# Patient Record
Sex: MALE | Race: WHITE | Employment: OTHER | URBAN - METROPOLITAN AREA
[De-identification: names, ages, dates, MRNs, and addresses within clinical notes are randomized per-mention and may not be internally consistent; named-entity substitution may affect disease eponyms.]

---

## 2018-01-17 ENCOUNTER — HOSPITAL ENCOUNTER (OUTPATIENT)
Dept: CT IMAGING | Age: 73
Discharge: HOME | DRG: 175 | End: 2018-01-17
Attending: INTERNAL MEDICINE
Payer: MEDICARE

## 2018-01-17 ENCOUNTER — HOSPITAL ENCOUNTER (OUTPATIENT)
Dept: VASCULAR SURGERY | Age: 73
Discharge: HOME | DRG: 175 | End: 2018-01-17
Attending: INTERNAL MEDICINE
Payer: MEDICARE

## 2018-01-17 ENCOUNTER — HOSPITAL ENCOUNTER (INPATIENT)
Age: 73
LOS: 2 days | Discharge: HOME | DRG: 175 | End: 2018-01-19
Attending: EMERGENCY MEDICINE | Admitting: INTERNAL MEDICINE
Payer: MEDICARE

## 2018-01-17 DIAGNOSIS — R60.0 LEG EDEMA, LEFT: ICD-10-CM

## 2018-01-17 DIAGNOSIS — I26.99 BILATERAL PULMONARY EMBOLISM (HCC): Primary | ICD-10-CM

## 2018-01-17 DIAGNOSIS — R06.02 SHORTNESS OF BREATH: ICD-10-CM

## 2018-01-17 DIAGNOSIS — I82.402 ACUTE DEEP VEIN THROMBOSIS (DVT) OF LEFT LOWER EXTREMITY, UNSPECIFIED VEIN (HCC): ICD-10-CM

## 2018-01-17 DIAGNOSIS — R06.00 DYSPNEA, UNSPECIFIED TYPE: ICD-10-CM

## 2018-01-17 PROBLEM — J18.9 PNEUMONIA: Status: ACTIVE | Noted: 2018-01-17

## 2018-01-17 PROBLEM — R77.8 TROPONIN I ABOVE REFERENCE RANGE: Status: ACTIVE | Noted: 2018-01-17

## 2018-01-17 LAB
ALBUMIN SERPL-MCNC: 3.5 G/DL (ref 3.4–5)
ALBUMIN/GLOB SERPL: 1 {RATIO} (ref 0.8–1.7)
ALP SERPL-CCNC: 70 U/L (ref 45–117)
ALT SERPL-CCNC: 32 U/L (ref 16–61)
ANION GAP SERPL CALC-SCNC: 9 MMOL/L (ref 3–18)
APTT PPP: 142.2 SEC (ref 23–36.4)
APTT PPP: 34.3 SEC (ref 23–36.4)
AST SERPL-CCNC: 39 U/L (ref 15–37)
BASOPHILS # BLD: 0 K/UL (ref 0–0.06)
BASOPHILS NFR BLD: 0 % (ref 0–2)
BILIRUB SERPL-MCNC: 0.9 MG/DL (ref 0.2–1)
BUN SERPL-MCNC: 19 MG/DL (ref 7–18)
BUN/CREAT SERPL: 13 (ref 12–20)
CALCIUM SERPL-MCNC: 9.5 MG/DL (ref 8.5–10.1)
CHLORIDE SERPL-SCNC: 99 MMOL/L (ref 100–108)
CK MB CFR SERPL CALC: 1.8 % (ref 0–4)
CK MB SERPL-MCNC: 3.9 NG/ML (ref 5–25)
CK SERPL-CCNC: 221 U/L (ref 39–308)
CO2 SERPL-SCNC: 30 MMOL/L (ref 21–32)
CREAT SERPL-MCNC: 1.52 MG/DL (ref 0.6–1.3)
CREAT UR-MCNC: 1.5 MG/DL (ref 0.6–1.3)
DIFFERENTIAL METHOD BLD: ABNORMAL
EOSINOPHIL # BLD: 0.3 K/UL (ref 0–0.4)
EOSINOPHIL NFR BLD: 3 % (ref 0–5)
ERYTHROCYTE [DISTWIDTH] IN BLOOD BY AUTOMATED COUNT: 13.2 % (ref 11.6–14.5)
GLOBULIN SER CALC-MCNC: 3.4 G/DL (ref 2–4)
GLUCOSE SERPL-MCNC: 95 MG/DL (ref 74–99)
HCT VFR BLD AUTO: 44.9 % (ref 36–48)
HGB BLD-MCNC: 14.3 G/DL (ref 13–16)
INR PPP: 1 (ref 0.8–1.2)
LYMPHOCYTES # BLD: 2.5 K/UL (ref 0.9–3.6)
LYMPHOCYTES NFR BLD: 24 % (ref 21–52)
MCH RBC QN AUTO: 31.2 PG (ref 24–34)
MCHC RBC AUTO-ENTMCNC: 31.8 G/DL (ref 31–37)
MCV RBC AUTO: 98 FL (ref 74–97)
MONOCYTES # BLD: 1.2 K/UL (ref 0.05–1.2)
MONOCYTES NFR BLD: 12 % (ref 3–10)
NEUTS SEG # BLD: 6.2 K/UL (ref 1.8–8)
NEUTS SEG NFR BLD: 61 % (ref 40–73)
PLATELET # BLD AUTO: 225 K/UL (ref 135–420)
PMV BLD AUTO: 9.5 FL (ref 9.2–11.8)
POTASSIUM SERPL-SCNC: 4.3 MMOL/L (ref 3.5–5.5)
PROT SERPL-MCNC: 6.9 G/DL (ref 6.4–8.2)
PROTHROMBIN TIME: 12.6 SEC (ref 11.5–15.2)
RBC # BLD AUTO: 4.58 M/UL (ref 4.7–5.5)
SODIUM SERPL-SCNC: 138 MMOL/L (ref 136–145)
TROPONIN I SERPL-MCNC: 0.08 NG/ML (ref 0–0.06)
WBC # BLD AUTO: 10.2 K/UL (ref 4.6–13.2)

## 2018-01-17 PROCEDURE — 36415 COLL VENOUS BLD VENIPUNCTURE: CPT | Performed by: EMERGENCY MEDICINE

## 2018-01-17 PROCEDURE — 93971 EXTREMITY STUDY: CPT

## 2018-01-17 PROCEDURE — 65270000029 HC RM PRIVATE

## 2018-01-17 PROCEDURE — 80053 COMPREHEN METABOLIC PANEL: CPT | Performed by: EMERGENCY MEDICINE

## 2018-01-17 PROCEDURE — 85025 COMPLETE CBC W/AUTO DIFF WBC: CPT | Performed by: EMERGENCY MEDICINE

## 2018-01-17 PROCEDURE — 82565 ASSAY OF CREATININE: CPT

## 2018-01-17 PROCEDURE — 85730 THROMBOPLASTIN TIME PARTIAL: CPT | Performed by: EMERGENCY MEDICINE

## 2018-01-17 PROCEDURE — 99284 EMERGENCY DEPT VISIT MOD MDM: CPT

## 2018-01-17 PROCEDURE — 71260 CT THORAX DX C+: CPT

## 2018-01-17 PROCEDURE — 74011636320 HC RX REV CODE- 636/320: Performed by: INTERNAL MEDICINE

## 2018-01-17 PROCEDURE — 84484 ASSAY OF TROPONIN QUANT: CPT | Performed by: EMERGENCY MEDICINE

## 2018-01-17 PROCEDURE — 93005 ELECTROCARDIOGRAM TRACING: CPT

## 2018-01-17 PROCEDURE — 85610 PROTHROMBIN TIME: CPT | Performed by: EMERGENCY MEDICINE

## 2018-01-17 PROCEDURE — 96365 THER/PROPH/DIAG IV INF INIT: CPT

## 2018-01-17 PROCEDURE — 74011250636 HC RX REV CODE- 250/636: Performed by: EMERGENCY MEDICINE

## 2018-01-17 PROCEDURE — 94762 N-INVAS EAR/PLS OXIMTRY CONT: CPT

## 2018-01-17 RX ORDER — SODIUM CHLORIDE 0.9 % (FLUSH) 0.9 %
5-10 SYRINGE (ML) INJECTION EVERY 8 HOURS
Status: DISCONTINUED | OUTPATIENT
Start: 2018-01-17 | End: 2018-01-19 | Stop reason: HOSPADM

## 2018-01-17 RX ORDER — ONDANSETRON 2 MG/ML
4 INJECTION INTRAMUSCULAR; INTRAVENOUS
Status: DISCONTINUED | OUTPATIENT
Start: 2018-01-17 | End: 2018-01-19 | Stop reason: HOSPADM

## 2018-01-17 RX ORDER — SODIUM CHLORIDE 0.9 % (FLUSH) 0.9 %
5-10 SYRINGE (ML) INJECTION AS NEEDED
Status: DISCONTINUED | OUTPATIENT
Start: 2018-01-17 | End: 2018-01-19 | Stop reason: HOSPADM

## 2018-01-17 RX ORDER — ALBUTEROL SULFATE 0.83 MG/ML
2.5 SOLUTION RESPIRATORY (INHALATION)
Status: DISCONTINUED | OUTPATIENT
Start: 2018-01-18 | End: 2018-01-19 | Stop reason: HOSPADM

## 2018-01-17 RX ORDER — HEPARIN SODIUM 10000 [USP'U]/100ML
18-36 INJECTION, SOLUTION INTRAVENOUS
Status: DISCONTINUED | OUTPATIENT
Start: 2018-01-17 | End: 2018-01-19

## 2018-01-17 RX ORDER — LEVOFLOXACIN 5 MG/ML
750 INJECTION, SOLUTION INTRAVENOUS EVERY 24 HOURS
Status: DISCONTINUED | OUTPATIENT
Start: 2018-01-17 | End: 2018-01-19 | Stop reason: CLARIF

## 2018-01-17 RX ORDER — LEVOTHYROXINE SODIUM 75 UG/1
88 TABLET ORAL
COMMUNITY

## 2018-01-17 RX ORDER — HEPARIN SODIUM 1000 [USP'U]/ML
80 INJECTION, SOLUTION INTRAVENOUS; SUBCUTANEOUS ONCE
Status: COMPLETED | OUTPATIENT
Start: 2018-01-17 | End: 2018-01-17

## 2018-01-17 RX ORDER — ACETAMINOPHEN 325 MG/1
650 TABLET ORAL
Status: DISCONTINUED | OUTPATIENT
Start: 2018-01-17 | End: 2018-01-19 | Stop reason: HOSPADM

## 2018-01-17 RX ADMIN — HEPARIN SODIUM AND DEXTROSE 18 UNITS/KG/HR: 10000; 5 INJECTION INTRAVENOUS at 15:16

## 2018-01-17 RX ADMIN — IOPAMIDOL 80 ML: 612 INJECTION, SOLUTION INTRAVENOUS at 15:00

## 2018-01-17 RX ADMIN — HEPARIN SODIUM 6820 UNITS: 1000 INJECTION, SOLUTION INTRAVENOUS; SUBCUTANEOUS at 15:15

## 2018-01-17 NOTE — ED PROVIDER NOTES
EMERGENCY DEPARTMENT HISTORY AND PHYSICAL EXAM    2:59 PM      Date: 1/17/2018  Patient Name: Blade Baez    History of Presenting Illness     Chief Complaint   Patient presents with    Shortness of Breath         History Provided By: Patient    Chief Complaint: SOB  Duration:  1/11/2018  Timing:  Progressive  Location: Generalized  Quality: N/A  Severity: 0 out of 10  Modifying Factors: SOB worse with ambulation. Associated Symptoms: LLE swelling    Additional History (Context): Blade Baez is a 81 y.o. male with PMHx of HTN who presents to the ED with c/o acute SOB worsening since Thursday 1/11. Patient states he works for [employer] in [place] and was travelling for work via plane and noticed LLE swelling 3 days ago. Reports SOB worse with ambulation. Also notes occasional cough, unchanged. Denies chest pain, fever, nausea, vomiting or BLE pain. Denies hx of blood in stool. Admits he was at his PCP's office this morning where his O2 sats were found to be between 89-94 % on RA. Reports PCP scheduled him for outpatient duplex and CT chest at [facility], of which radiologist called me with result of moderate burden bilateral PE's. Radiology sent pt to ED here at [facility]. No other symptoms or concerns were expressed. PCP: Alycia Chery MD    Past History     Past Medical History:  Past Medical History:   Diagnosis Date    Calculus of kidney     Calculus of ureter     History of vasectomy     Hydrocele, unspecified     Enlarged left hydrocele    Hypertension     Hypothyroidism     Multiple lipomas     right arm and forearm       Past Surgical History:  Past Surgical History:   Procedure Laterality Date    HX HERNIA REPAIR  1952    HX OTHER SURGICAL  2/11/11    Left hydrocelectomy    HX OTHER SURGICAL Right 10/7/13    Excision of lipomas arm and forearm    REMOVAL OF KIDNEY STONE  3/24/11    Ureteroscopic stone extraction with stent placement.        Family History:  History reviewed. No pertinent family history. Social History:  Social History   Substance Use Topics    Smoking status: Former Smoker    Smokeless tobacco: None    Alcohol use No       Allergies:  No Known Allergies      Review of Systems     Review of Systems   Constitutional: Negative for fever. HENT: Negative for sore throat. Eyes: Negative for redness and visual disturbance. Respiratory: Positive for shortness of breath. Negative for wheezing. Cardiovascular: Positive for leg swelling. Negative for chest pain. Gastrointestinal: Negative for abdominal pain, blood in stool, nausea and vomiting. Endocrine: Negative for polyuria. Genitourinary: Negative for dysuria. Musculoskeletal: Negative for arthralgias and neck stiffness. Skin: Negative for rash. Neurological: Negative for headaches. All other systems reviewed and are negative. Physical Exam     Visit Vitals    /88    Pulse 92    Temp 98.5 °F (36.9 °C)    Resp 20    Ht 5' 10.5\" (1.791 m)    Wt 85.3 kg (188 lb)    SpO2 97%    BMI 26.59 kg/m2     Physical Exam   Constitutional: He is oriented to person, place, and time. He appears well-developed and well-nourished. No distress. HENT:   Head: Normocephalic and atraumatic. Mouth/Throat: Oropharynx is clear and moist.   Eyes: Conjunctivae are normal. Pupils are equal, round, and reactive to light. No scleral icterus. Neck: Normal range of motion. Neck supple. Cardiovascular: Normal rate and intact distal pulses. Capillary refill < 3 seconds   Pulmonary/Chest: Effort normal and breath sounds normal. No respiratory distress. He has no wheezes. O2 99% RA   Abdominal: Soft. Bowel sounds are normal. He exhibits no distension. There is no tenderness. Musculoskeletal: Normal range of motion. He exhibits edema. Pitting edema LLE  No calf tenderness   Lymphadenopathy:     He has no cervical adenopathy.    Neurological: He is alert and oriented to person, place, and time. No cranial nerve deficit. Skin: Skin is warm and dry. He is not diaphoretic. There is erythema (L calf, mild). Nursing note and vitals reviewed. Diagnostic Study Results     Labs -  Recent Results (from the past 12 hour(s))   POC CREATININE    Collection Time: 01/17/18  2:06 PM   Result Value Ref Range    Creatinine, POC 1.5 (H) 0.6 - 1.3 MG/DL    GFRAA, POC 56 (L) >60 ml/min/1.73m2    GFRNA, POC 46 (L) >60 ml/min/1.73m2   CBC WITH AUTOMATED DIFF    Collection Time: 01/17/18  3:15 PM   Result Value Ref Range    WBC 10.2 4.6 - 13.2 K/uL    RBC 4.58 (L) 4.70 - 5.50 M/uL    HGB 14.3 13.0 - 16.0 g/dL    HCT 44.9 36.0 - 48.0 %    MCV 98.0 (H) 74.0 - 97.0 FL    MCH 31.2 24.0 - 34.0 PG    MCHC 31.8 31.0 - 37.0 g/dL    RDW 13.2 11.6 - 14.5 %    PLATELET 650 460 - 022 K/uL    MPV 9.5 9.2 - 11.8 FL    NEUTROPHILS 61 40 - 73 %    LYMPHOCYTES 24 21 - 52 %    MONOCYTES 12 (H) 3 - 10 %    EOSINOPHILS 3 0 - 5 %    BASOPHILS 0 0 - 2 %    ABS. NEUTROPHILS 6.2 1.8 - 8.0 K/UL    ABS. LYMPHOCYTES 2.5 0.9 - 3.6 K/UL    ABS. MONOCYTES 1.2 0.05 - 1.2 K/UL    ABS. EOSINOPHILS 0.3 0.0 - 0.4 K/UL    ABS. BASOPHILS 0.0 0.0 - 0.06 K/UL    DF AUTOMATED     METABOLIC PANEL, COMPREHENSIVE    Collection Time: 01/17/18  3:15 PM   Result Value Ref Range    Sodium 138 136 - 145 mmol/L    Potassium 4.3 3.5 - 5.5 mmol/L    Chloride 99 (L) 100 - 108 mmol/L    CO2 30 21 - 32 mmol/L    Anion gap 9 3.0 - 18 mmol/L    Glucose 95 74 - 99 mg/dL    BUN 19 (H) 7.0 - 18 MG/DL    Creatinine 1.52 (H) 0.6 - 1.3 MG/DL    BUN/Creatinine ratio 13 12 - 20      GFR est AA 55 (L) >60 ml/min/1.73m2    GFR est non-AA 46 (L) >60 ml/min/1.73m2    Calcium 9.5 8.5 - 10.1 MG/DL    Bilirubin, total 0.9 0.2 - 1.0 MG/DL    ALT (SGPT) 32 16 - 61 U/L    AST (SGOT) 39 (H) 15 - 37 U/L    Alk.  phosphatase 70 45 - 117 U/L    Protein, total 6.9 6.4 - 8.2 g/dL    Albumin 3.5 3.4 - 5.0 g/dL    Globulin 3.4 2.0 - 4.0 g/dL    A-G Ratio 1.0 0.8 - 1.7     PROTHROMBIN TIME + INR    Collection Time: 01/17/18  3:15 PM   Result Value Ref Range    Prothrombin time 12.6 11.5 - 15.2 sec    INR 1.0 0.8 - 1.2     PTT    Collection Time: 01/17/18  3:15 PM   Result Value Ref Range    aPTT 34.3 23.0 - 36.4 SEC   CARDIAC PANEL,(CK, CKMB & TROPONIN)    Collection Time: 01/17/18  3:15 PM   Result Value Ref Range     39 - 308 U/L    CK - MB 3.9 (H) <3.6 ng/ml    CK-MB Index 1.8 0.0 - 4.0 %    Troponin-I, Qt. 0.08 (H) 0.00 - 0.06 NG/ML   EKG, 12 LEAD, SUBSEQUENT    Collection Time: 01/17/18  3:29 PM   Result Value Ref Range    Ventricular Rate 85 BPM    Atrial Rate 85 BPM    P-R Interval 184 ms    QRS Duration 96 ms    Q-T Interval 374 ms    QTC Calculation (Bezet) 445 ms    Calculated P Axis 48 degrees    Calculated R Axis -15 degrees    Calculated T Axis 31 degrees    Diagnosis       Normal sinus rhythm  Septal infarct , age undetermined  Possible Lateral infarct , age undetermined  Abnormal ECG         Radiologic Studies -   No orders to display    Duplex LLE:  Left leg :  1. Acute occlusive deep venous thrombosis identified in the common  femoral, femoral and  deep femoral veins. 2. Acute non-occlusive  deep venous thrombosis identified in the  popliteal vein. 3. Deep veins visualized include the common femoral, femoral, deep  femoral, popliteal, posterior tibial and peroneal veins. 4. Acute non-occlusive superficial venous thrombosis identified in the   great saphenous vein at the sapheno-femoral junction. No evidence of deep vein thrombosis in the contralateral common  femoral vein. 5. Acute non-occlusive superficial venous thrombosis identified in the   great saphenous vein at the sapheno-femoral junction. 6. Superficial vein visualized include the great saphenous vein. CT Chest:  1.  Moderate to large burden of acute pulmonary emboli in bilateral lower lobes  with complete to near complete occlusion.  -Patient was informed and taken to ER registration by the technologist. Also  discussed with the ER physician Dr. Daxa Gann. 2. Lingular consolidative opacities with air bronchogram likely pulmonary  infarct versus infection. 3. Plaque-like pleural thickening or loculated fluid in the lateral right  midlung measuring 3.6 x 1.2 cm (Hounsfield unit 17), probably loculated fluid. 4. Trace left pleural effusion. Medical Decision Making   I am the first provider for this patient. I reviewed the vital signs, available nursing notes, past medical history, past surgical history, family history and social history. Vital Signs-Reviewed the patient's vital signs. Pulse Oximetry Analysis -  99% on room air, normal    Cardiac Monitor:  Rate: 85  Rhythm:  Normal Sinus Rhythm     EKG: Interpreted by the EP. Time Interpreted: 15:31   Rate: 85   Rhythm: Normal Sinus Rhythm    Interpretation: Normal QRS duration. No ST elevation. No T wave inversion. Records Reviewed: Nursing Notes, Old Medical Records and Previous Radiology Studies (Time of Review: 2:59 PM)    Provider Notes (Medical Decision Making):  MDM  Number of Diagnoses or Management Options  Acute deep vein thrombosis (DVT) of left lower extremity, unspecified vein ([address]):   Bilateral pulmonary embolism ([address]):   Dyspnea, unspecified type: new, needed workup  Diagnosis management comments: Radiologist called and informed patient with moderate burden pulmonary embolism B/L low lobes. Also patient with LLE DVT. Patient was on long trip to [facility] and was hiking. Reports SOB, will give IV Heparin bolus and gtt. Patient will require admission. Patient is talking, airway patent. I explained dx's and plan for admission to [facility], pt agrees.         Amount and/or Complexity of Data Reviewed  Clinical lab tests: ordered and reviewed  Tests in the radiology section of CPT®: ordered and reviewed  Tests in the medicine section of CPT®: ordered and reviewed  Discussion of test results with the performing providers: yes  Review and summarize past medical records: yes  Discuss the patient with other providers: yes  Independent visualization of images, tracings, or specimens: yes    Risk of Complications, Morbidity, and/or Mortality  Presenting problems: high  Diagnostic procedures: high  Management options: high    Critical Care  Total time providing critical care: 30-74 minutes    Critical Care:  3:21 PM  I have spent 32 minutes of critical care time involved in lab review, consultations with specialist, family decision-making, and documentation. During this entire length of time I was immediately available to the patient. Critical Care: The reason for providing this level of medical care for this critically ill patient was due a critical illness that impaired one or more vital organ systems such that there was a high probability of imminent or life threatening deterioration in the patients condition. This care involved high complexity decision making to assess, manipulate, and support vital system functions, to treat this degreee vital organ system failure and to prevent further life threatening deterioration of the patients condition. Medications   heparin 25,000 units in D5W 250 ml infusion (18 Units/kg/hr × 85.3 kg IntraVENous New Bag 1/17/18 1516)   heparin (porcine) 1,000 unit/mL injection 6,820 Units (6,820 Units IntraVENous Given 1/17/18 1515)     ED Course: Progress Notes, Reevaluation, and Consults:  2:50: Radiologist called stating patient had an outpatient CTA chest which demonstrated large PE. Reports patient called in to be evaluated in ED.    2:59: Spoke to vascular tech, states patient positive for DVT in LLE in outpatient vascular study. Consult:  Discussed care with Dr. Mila Manning, hospitalist. Standard discussion; including history of patients chief complaint, available diagnostic results, and treatment course. Accepts admit. 3:59 PM, 1/17/2018     For Hospitalized Patients:    1.  Hospitalization Decision Time:  The decision to hospitalize the patient was made by Dr. Mackenzie Agosto at 3:32 PM on 1/17/2018    2. Aspirin: Aspirin was not given because the patient did not present with a stroke at the time of their Emergency Department evaluation    Diagnosis     Clinical Impression:   1. Bilateral pulmonary embolism ([address]    2. Acute deep vein thrombosis (DVT) of left lower extremity, unspecified vein (HCC)    3. Dyspnea, unspecified type        Disposition: Admit. Follow-up Information     None           _______________________________    Attestations:  Scribe Attestation     Kacey acting as a scribe for and in the presence of Ludwig Barrera DO      January 17, 2018 at 2:59 PM       Provider Attestation:      I personally performed the services described in the documentation, reviewed the documentation, as recorded by the scribe in my presence, and it accurately and completely records my words and actions.  January 17, 2018 at 2:59 PM - Ludwig Barrera DO    _______________________________

## 2018-01-17 NOTE — ED NOTES
TRANSFER - OUT REPORT:    TelephoneVerbal report given to [facility]) on [facility]  being transferred to [place] [id number](unit) for routine progression of care       Report consisted of patients Situation, Background, Assessment and   Recommendations(SBAR). Information from the following report(s) SBAR, Kardex, ED Summary, Procedure Summary, Intake/Output, MAR, Accordion and Recent Results was reviewed with the receiving nurse. Opportunity for questions and clarification was provided.       Patient transported with:   Monitor  Tech

## 2018-01-17 NOTE — IP AVS SNAPSHOT
[address] Patient: Иван Hall 
MRN: [id number] :1945 About your hospitalization You were admitted on:  2018 You last received care in the:  [facility] [place] [id number] [facility] You were discharged on:  2018 Why you were hospitalized Your primary diagnosis was:  Not on File Your diagnoses also included:  Dyspnea, Bilateral Pulmonary Embolism (Hcc), Leg Dvt (Deep Venous Thromboembolism), Acute, Left (Hcc), Pneumonia, Pulmonary Embolism (Hcc), Troponin I Above Reference Range Follow-up Information Follow up With Details Comments Contact Info Drew Rosa MD On 2018 @8:30AM 3125 [facility] [address] [address] 
[phone] Beronica Clements MD   [address] [place] [address] [address] [address] 
[phone] Discharge Orders Procedure Order Date Status Priority Quantity Spec Type Associated Dx CT CHEST W CONT 18 1237 Future Routine 1 Questions: Is Patient Allergic to Contrast Dye?:  No  
  STAT Creatinine as indicated: Yes  
        
 2D ECHO LIMTED ADULT W OR WO CONTR 18 1237 Future Routine 1 Questions: Reason for Exam:  bilateral pulmonary emboli Contrast Enhancement (Bubble Study, Definity, Optison) may be used if criteria listed in established evidence-based protocol has been identified.:  Yes A check prema indicates which time of day the medication should be taken. My Medications START taking these medications Instructions Each Dose to Equal  
 Morning Noon Evening Bedtime  
 famotidine 20 mg tablet Commonly known as:  PEPCID Your last dose was: Your next dose is: Take 1 Tab by mouth two (2) times a day. 20 mg  
    
   
   
   
  
 rivaroxaban 15 mg (42)- 20 mg (9) Dspk Commonly known as:  Mc SHARMA Your last dose was: Your next dose is: Take one 15 mg tablet twice a day with food for the first 21 days. Then, take one 20 mg tablet once a day with food for 9 days. CONTINUE taking these medications Instructions Each Dose to Equal  
 Morning Noon Evening Bedtime BENICAR PO Your last dose was: Your next dose is: Take 20 mg by mouth daily. 1/2 tab daily 20 mg  
    
   
   
   
  
 LIPITOR PO Your last dose was: Your next dose is: Take 10 mg by mouth. 10 mg  
    
   
   
   
  
 MULTIVITAMIN PO Your last dose was: Your next dose is: Take  by mouth. SYNTHROID 75 mcg tablet Generic drug:  levothyroxine Your last dose was: Your next dose is: Take  by mouth Daily (before breakfast). VALIUM PO Your last dose was: Your next dose is: Take  by mouth. STOP taking these medications ASPIRIN PO  
   
  
  
ASK your doctor about these medications Instructions Each Dose to Equal  
 Morning Noon Evening Bedtime VALTREX 500 mg tablet Generic drug:  valACYclovir Your last dose was: Your next dose is: Take  by mouth two (2) times a day. Where to Get Your Medications These medications were sent to [address] [place] [address], [address] [place] [address] [address]. [address] [place] [address] Phone:  [phone]  
  rivaroxaban 15 mg (42)- 20 mg (9) Dspk Information on where to get these meds will be given to you by the nurse or doctor. ! Ask your nurse or doctor about these medications  
  famotidine 20 mg tablet Discharge Instructions Discharge Instructions Patient: Daisy Lares MRN: [id number]  [facility]: [id number] YOB: 1945  Age: 81 y.o. Sex: male DOA: 1/17/2018 LOS:  LOS: 2 days   Discharge Date: DIET:  Cardiac Diet ACTIVITY: Activity as tolerated ADDITIONAL INFORMATION: If you experience any of the following symptoms but not limited to Fever, chills, nausea, vomiting, diarrhea, change in mentation, falling, bleeding, shortness of breath, chest pain, please call your primary care physician or return to the emergency room if you cannot get hold of your doctor:  
 
FOLLOW UP CARE: 
PCP in 1 week Pulmonary in 10-14 days Phillip Guevara NP 
1/19/2018 12:20 PM 
 
 
 
 
 
  
Deep Vein Thrombosis: Care Instructions Your Care Instructions A deep vein thrombosis (DVT) is a blood clot in certain veins of the legs, pelvis, or arms. Blood clots in these veins need to be treated because they can get bigger, break loose, and travel through the bloodstream to the lungs. A blood clot in a lung can be life-threatening. The doctor may have given you a blood thinner (anticoagulant). A blood thinner can stop the blood clot from growing larger and prevent new clots from forming. You will need to take a blood thinner for 3 to 6 months or longer. The doctor has checked you carefully, but problems can develop later. If you notice any problems or new symptoms, get medical treatment right away. Follow-up care is a key part of your treatment and safety. Be sure to make and go to all appointments, and call your doctor if you are having problems. It's also a good idea to know your test results and keep a list of the medicines you take. How can you care for yourself at home? · Take your medicines exactly as prescribed. Call your doctor if you think you are having a problem with your medicine. · If you are taking a blood thinner, be sure you get instructions about how to take your medicine safely. Blood thinners can cause serious bleeding problems. · Wear compression stockings if your doctor recommends them.  These stockings are tighter at the feet than on the legs. They may reduce pain and swelling in your legs. But there are different types of stockings, and they need to fit right. So your doctor will recommend what you need. · When you sit, use a pillow to raise the arm or leg that has the blood clot. Try to keep it above the level of your heart. When should you call for help? Call 911 anytime you think you may need emergency care. For example, call if: 
? · You passed out (lost consciousness). ? · You have symptoms of a blood clot in your lung (called a pulmonary embolism). These include: 
¨ Sudden chest pain. ¨ Trouble breathing. ¨ Coughing up blood. ?Call your doctor now or seek immediate medical care if: 
? · You have new or worse trouble breathing. ? · You are dizzy or lightheaded, or you feel like you may faint. ? · You have symptoms of a blood clot in your arm or leg. These may include: 
¨ Pain in the arm, calf, back of the knee, thigh, or groin. ¨ Redness and swelling in the arm, leg, or groin. ? Watch closely for changes in your health, and be sure to contact your doctor if: 
? · You do not get better as expected. Where can you learn more? Go to http://wyatt-clara.info/. Enter L758 in the search box to learn more about \"Deep Vein Thrombosis: Care Instructions. \" Current as of: March 20, 2017 Content Version: 11.4 © 7974-2289 SKINNYprice. Care instructions adapted under license by [employer] (which disclaims liability or warranty for this information). If you have questions about a medical condition or this instruction, always ask your healthcare professional. [place] [address] any warranty or liability for your use of this information. Pulmonary Embolism: Care Instructions Your Care Instructions Pulmonary embolism is the sudden blockage of an artery in the lung.  Blood clots in the deep veins of the leg or pelvis (deep vein thrombosis, or DVT) are the most common cause. These blood clots can travel to the lungs. Pulmonary embolism can be very serious. Because you have had one pulmonary embolism, you are at greater risk for having another one. But you can take steps to prevent another pulmonary embolism by following your doctor's instructions. You will probably take a prescription blood-thinning medicine to prevent blood clots. A blood thinner can stop a blood clot from growing larger and prevent new clots from forming. Follow-up care is a key part of your treatment and safety. Be sure to make and go to all appointments, and call your doctor if you are having problems. It's also a good idea to know your test results and keep a list of the medicines you take. How can you care for yourself at home? · Take your medicines exactly as prescribed. Call your doctor if you think you are having a problem with your medicine. You will get more details on the specific medicines your doctor prescribes. · If you are taking a blood thinner, be sure you get instructions about how to take your medicine safely. Blood thinners can cause serious bleeding problems. Preventing future pulmonary embolisms · Exercise. Keep blood moving in your legs to keep clots from forming. If you are traveling by car, stop every hour or so. Get out and walk around for a few minutes. If you are traveling by bus, train, or plane, get out of your seat and walk up and down the aisles every hour or so. You also can do leg exercises while you are seated. Pump your feet up and down by pulling your toes up toward your knees then pointing them down. · Get up out of bed as soon as possible after an illness or surgery. · Do not smoke. If you need help quitting, talk to your doctor about stop-smoking programs and medicines. These can increase your chances of quitting for good. · Check with your doctor before taking hormone or birth control pills. These may increase your risk of blot clots. · Ask your doctor about wearing compression stockings to help prevent blood clots in your legs. There are different types of stockings, and they need to fit right. So your doctor will recommend what you need. When should you call for help? Call 911 anytime you think you may need emergency care. For example, call if: 
? · You have shortness of breath. ? · You have chest pain. ? · You passed out (lost consciousness). ? · You cough up blood. ?Call your doctor now or seek immediate medical care if: 
? · You have new or worsening pain or swelling in your leg. ? Watch closely for changes in your health, and be sure to contact your doctor if: 
? · You do not get better as expected. Where can you learn more? Go to http://wyatt-clara.info/. Enter E411 in the search box to learn more about \"Pulmonary Embolism: Care Instructions. \" Current as of: March 20, 2017 Content Version: 11.4 © 9446-9279 Packetmotion. Care instructions adapted under license by [employer] (which disclaims liability or warranty for this information). If you have questions about a medical condition or this instruction, always ask your healthcare professional. [address] any warranty or liability for your use of this information. Pneumonia: Care Instructions Your Care Instructions Pneumonia is an infection of the lungs. Most cases are caused by infections from bacteria or viruses. Pneumonia may be mild or very severe. If it is caused by bacteria, you will be treated with antibiotics. It may take a few weeks to a few months to recover fully from pneumonia, depending on how sick you were and whether your overall health is good. Follow-up care is a key part of your treatment and safety.  Be sure to make and go to all appointments, and call your doctor if you are having problems. It's also a good idea to know your test results and keep a list of the medicines you take. How can you care for yourself at home? · Take your antibiotics exactly as directed. Do not stop taking the medicine just because you are feeling better. You need to take the full course of antibiotics. · Take your medicines exactly as prescribed. Call your doctor if you think you are having a problem with your medicine. · Get plenty of rest and sleep. You may feel weak and tired for a while, but your energy level will improve with time. · To prevent dehydration, drink plenty of fluids, enough so that your urine is light yellow or clear like water. Choose water and other caffeine-free clear liquids until you feel better. If you have kidney, heart, or liver disease and have to limit fluids, talk with your doctor before you increase the amount of fluids you drink. · Take care of your cough so you can rest. A cough that brings up mucus from your lungs is common with pneumonia. It is one way your body gets rid of the infection. But if coughing keeps you from resting or causes severe fatigue and chest-wall pain, talk to your doctor. He or she may suggest that you take a medicine to reduce the cough. · Use a vaporizer or humidifier to add moisture to your bedroom. Follow the directions for cleaning the machine. · Do not smoke or allow others to smoke around you. Smoke will make your cough last longer. If you need help quitting, talk to your doctor about stop-smoking programs and medicines. These can increase your chances of quitting for good. · Take an over-the-counter pain medicine, such as acetaminophen (Tylenol), ibuprofen (Advil, Motrin), or naproxen (Aleve). Read and follow all instructions on the label. · Do not take two or more pain medicines at the same time unless the doctor told you to.  Many pain medicines have acetaminophen, which is Tylenol. Too much acetaminophen (Tylenol) can be harmful. · If you were given a spirometer to measure how well your lungs are working, use it as instructed. This can help your doctor tell how your recovery is going. · To prevent pneumonia in the future, talk to your doctor about getting a flu vaccine (once a year) and a pneumococcal vaccine (one time only for most people). When should you call for help? Call 911 anytime you think you may need emergency care. For example, call if: 
? · You have severe trouble breathing. ?Call your doctor now or seek immediate medical care if: 
? · You cough up dark brown or bloody mucus (sputum). ? · You have new or worse trouble breathing. ? · You are dizzy or lightheaded, or you feel like you may faint. ? Watch closely for changes in your health, and be sure to contact your doctor if: 
? · You have a new or higher fever. ? · You are coughing more deeply or more often. ? · You are not getting better after 2 days (48 hours). ? · You do not get better as expected. Where can you learn more? Go to http://wyatt-clara.info/. Enter [id number] in the search box to learn more about \"Pneumonia: Care Instructions. \" Current as of: May 12, 2017 Content Version: 11.4 © 2782-3300 Nginx. Care instructions adapted under license by [employer] (which disclaims liability or warranty for this information). If you have questions about a medical condition or this instruction, always ask your healthcare professional. [place] [address] any warranty or liability for your use of this information. Patient armband removed and shredded DISCHARGE SUMMARY from Nurse PATIENT INSTRUCTIONS: 
 
 
F-face looks uneven A-arms unable to move or move unevenly S-speech slurred or non-existent T-time-call 911 as soon as signs and symptoms begin-DO NOT go Back to bed or wait to see if you get better-TIME IS BRAIN. Warning Signs of HEART ATTACK Call 911 if you have these symptoms: 
? Chest discomfort. Most heart attacks involve discomfort in the center of the chest that lasts more than a few minutes, or that goes away and comes back. It can feel like uncomfortable pressure, squeezing, fullness, or pain. ? Discomfort in other areas of the upper body. Symptoms can include pain or discomfort in one or both arms, the back, neck, jaw, or stomach. ? Shortness of breath with or without chest discomfort. ? Other signs may include breaking out in a cold sweat, nausea, or lightheadedness. Don't wait more than five minutes to call [address]! Fast action can save your life. Calling 911 is almost always the fastest way to get lifesaving treatment. Emergency Medical Services staff can begin treatment when they arrive  up to an hour sooner than if someone gets to the hospital by car. The discharge information has been reviewed with the patient. The patient verbalized understanding. Discharge medications reviewed with the patient and appropriate educational materials and side effects teaching were provided. ___________________________________________________________________________________________________________________________________ Introducing [facility] SERVICES! Elsa Pabon introduces Solio patient portal. Now you can access parts of your medical record, email your doctor's office, and request medication refills online. 1. In your internet browser, go to [email or web address] 2. Click on the First Time User? Click Here link in the Sign In box. You will see the New Member Sign Up page. 3. Enter your Nevigo Access Code exactly as it appears below. You will not need to use this code after youve completed the sign-up process. If you do not sign up before the expiration date, you must request a new code. · Nevigo Access Code: [email or web address] Expires: 4/17/2018  9:42 AM 
 
4. Enter the last four digits of your Social Security Number (xxxx) and Date of Birth (mm/dd/yyyy) as indicated and click Submit. You will be taken to the next sign-up page. 5. Create a Nevigo ID. This will be your Nevigo login ID and cannot be changed, so think of one that is secure and easy to remember. 6. Create a Nevigo password. You can change your password at any time. 7. Enter your Password Reset Question and Answer. This can be used at a later time if you forget your password. 8. Enter your e-mail address. You will receive e-mail notification when new information is available in [address] 9. Click Sign Up. You can now view and download portions of your medical record. 10. Click the Download Summary menu link to download a portable copy of your medical information. If you have questions, please visit the Frequently Asked Questions section of the Nevigo website. Remember, Nevigo is NOT to be used for urgent needs. For medical emergencies, dial 911. Now available from your iPhone and Android! Unresulted Labs-Please follow up with your PCP about these lab tests Order Current Status CARDIOLIPIN AB PANEL In process FACTOR V LEIDEN In process LUPUS ANTICOAGULANT PANEL W/ REFLEX In process PROTEIN C ACTIVITY In process PROTEIN S ANTIGEN In process Providers Seen During Your Hospitalization Provider Specialty Primary office phone Lara Liu DO Emergency Medicine [phone] Lorenzo Trinidad MD Internal Medicine [phone] Yanet Ojeda MD [facility] [phone] Bobbi Richardson MD Internal Medicine [phone] Jane Osborn MD Family Practice [phone] Your Primary Care Physician (PCP) Primary Care Physician Office Phone Office Fax Rudolph Fuchs [phone] You are allergic to the following No active allergies Recent Documentation Height Weight BMI Smoking Status 1.791 m 85.6 kg 26.69 kg/m2 Former Smoker Emergency Contacts Name Discharge Info Relation Home Work Mobile Day,[place] DISCHARGE CAREGIVER [3] Spouse [3] [phone] Patient Belongings The following personal items are in your possession at time of discharge: 
  Dental Appliances: None  Visual Aid: Glasses, With patient      Home Medications: None   Jewelry: Watch, Ring  Clothing: Jacket/Coat, Pants, Shirt, Socks, Footwear    Other Valuables: Cell Phone Discharge Instructions Attachments/References RIVAROXABAN (BY MOUTH) (ENGLISH) FAMOTIDINE (BY MOUTH) (ENGLISH) Patient Handouts Rivaroxaban (By mouth) Rivaroxaban ([id number]-l-GMM-a-ban) Treats and prevents blood clots, which lowers the risk of stroke, deep vein thrombosis (DVT), pulmonary embolism (PE), and similar conditions. This medicine is a blood thinner. Brand Name(s): Xarelto, Xarelto Starter Pack There may be other brand names for this medicine. When This Medicine Should Not Be Used: This medicine is not right for everyone. Do not use it if you had an allergic reaction to rivaroxaban, or you have severe bleeding. How to Use This Medicine:  
Tablet · Take this medicine as directed, and take it at the same time each day. · 10-milligram (mg) tablet: Take with or without food. · 15-mg or 20-mg tablet: Take with food. · If you cannot swallow the tablets, you may crush the tablet and mix it with applesauce. Eat some food after you swallow the mixture. · Tube feeding: You may crush the tablet and mix the medicine in 50 milliliters (mL) of water before giving it via the tube. This must be followed by a feeding. · This medicine should come with a Medication Guide. Ask your pharmacist for a copy if you do not have one. · Missed dose: ¨ Ask your doctor or pharmacist if you are not sure what to do if you miss a dose. ¨ Once-daily dose: If you miss a dose or forget to use your medicine, use it as soon as you can on the same day. Do not use extra medicine to make up for a missed dose. ¨ Twice-daily dose to treat a blood clot (15-mg tablet): If you miss a dose or forget to use your medicine, use it as soon as you can on the same day. You may take 2 doses at the same time to make up for the missed dose. This is only for people who take a total of 30 mg per day. · Store the medicine in a closed container at room temperature, away from heat, moisture, and direct light. Drugs and Foods to Avoid: Ask your doctor or pharmacist before using any other medicine, including over-the-counter medicines, vitamins, and herbal products. · Some foods and medicines can affect how rivaroxaban works. Tell your doctor if you are using any of the following: ¨ NSAID medicine (including aspirin, celecoxib, diclofenac, ibuprofen, naproxen) ¨ Ketoconazole, itraconazole, lopinavir, ritonavir, indinavir, conivaptan, carbamazepine, phenytoin, rifampin, Bryan's wort ¨ Another blood thinner (including clopidogrel, enoxaparin, heparin, warfarin) Warnings While Using This Medicine: · Tell your doctor if you are pregnant or breastfeeding, or if you have kidney disease, liver disease, bleeding problems, or an artificial heart valve. · This medicine may increase your risk of bleeding. Be careful to avoid injuries that could cause bleeding. Stay away from rough sports or other situations where you could be bruised, cut, or hurt.  Brush and floss your teeth gently. Be careful when using sharp objects, including razors and fingernail clippers. Avoid picking your nose. If you need to blow your nose, blow it gently. · This medicine may cause nerve damage if you have a medical procedure done to your back, including anesthesia or a spinal puncture. This is more likely to happen if you have a history of back injury, back surgery, problems with your spine, or procedures or punctures to your back. Tell your doctor if you are also taking another blood thinner, because this also increases the risk. · Do not stop using this medicine suddenly without asking your doctor. You might have a higher risk of stroke for a short time after you stop using this medicine. · Tell any doctor or dentist who treats you that you are using this medicine. · Your doctor will do lab tests at regular visits to check on the effects of this medicine. Keep all appointments. · Keep all medicine out of the reach of children. Never share your medicine with anyone. Possible Side Effects While Using This Medicine:  
Call your doctor right away if you notice any of these side effects: · Allergic reaction: Itching or hives, swelling in your face or hands, swelling or tingling in your mouth or throat, chest tightness, trouble breathing · Blistering, peeling, or red skin rash · Decrease in how much or how often you urinate · Heavy menstrual bleeding, or vaginal bleeding · Red or brown urine, bloody or black, tarry stools · Unusual bleeding or bruising, including frequent nosebleeds · Vomiting blood or material that looks like coffee grounds If you notice other side effects that you think are caused by this medicine, tell your doctor. Call your doctor for medical advice about side effects. You may report side effects to FDA at 0-832-FVF-4887 © 2017 [facility] Information is for End User's use only and may not be sold, redistributed or otherwise used for commercial purposes. The above information is an  only. It is not intended as medical advice for individual conditions or treatments. Talk to your doctor, nurse or pharmacist before following any medical regimen to see if it is safe and effective for you. Famotidine (By mouth) Famotidine (ivf-CF-xa-analilia) Treats ulcers, gastroesophageal reflux disease (GERD), and conditions that cause the stomach to produce too much stomach acid. Also treats heartburn caused by acid indigestion. Brand Name(s): Acid Controller, Acid Reducer, [facility] Acid Reducer, Good Sense Acid Reducer, Heartburn Relief, Leader Acid Reducer, Pepcid, Pepcid AC, Quality Choice Acid Controller, [facility] Acid Reducer, [facility] Famotidine Acid Reducer, TopCare Acid Reducer There may be other brand names for this medicine. When This Medicine Should Not Be Used: You should not use this medicine if you have had an allergic reaction to famotidine or to similar medicines such as ranitidine (Zantac®), cimetidine (Tagamet®), or nizatidine (Axid®). How to Use This Medicine:  
Tablet, Chewable Tablet, Dissolving Tablet, Liquid · Your doctor will tell you how much medicine to use. Do not use more than directed. · Follow the instructions on the medicine label if you are using this medicine without a prescription. · The chewable tablet must be chewed completely before you swallow it. · If you are using the disintegrating tablet, make sure your hands are dry before you handle the tablet. Do not open the blister pack that contains the tablet until you are ready to take it. Remove the tablet from the blister pack by peeling back the foil, then taking the tablet out. Do not push the tablet through the foil. Place the tablet in your mouth. It should melt within 2 minutes. Swallow after the tablet has melted. · Shake the oral liquid medicine for 5 to 10 seconds before each use. Measure the medicine with a marked measuring spoon or medicine cup. If a dose is missed: · Take a dose as soon as you remember. If it is almost time for your next dose, wait until then and take a regular dose. Do not take extra medicine to make up for a missed dose. How to Store and Dispose of This Medicine: · Store the medicine in a closed container at room temperature, away from heat, moisture, and direct light. Do not freeze the oral liquid. · Ask your pharmacist, doctor, or health caregiver about the best way to dispose of any outdated medicine or medicine no longer needed. Throw away any unused oral liquid that is more than 3month old. · Keep all medicine out of the reach of children. Never share your medicine with anyone. Drugs and Foods to Avoid: Ask your doctor or pharmacist before using any other medicine, including over-the-counter medicines, vitamins, and herbal products. Warnings While Using This Medicine: · Make sure your doctor knows if you are pregnant or breastfeeding, or if you have kidney disease or liver disease. · This medicine might contain phenylalanine (aspartame). This is only a concern if you have a disorder called phenylketonuria (a problem with amino acids). If you have this condition, talk to your doctor before using this medicine. · Call your doctor if your symptoms do not improve or if they get worse. Possible Side Effects While Using This Medicine:  
Call your doctor right away if you notice any of these side effects: · Allergic reaction: Itching or hives, swelling in your face or hands, swelling or tingling in your mouth or throat, chest tightness, trouble breathing · Blistering, peeling, or red skin rash. · Dark-colored urine or pale stools. · Fast, pounding, or uneven heartbeat. · Fever, chills, cough, sore throat, and body aches. · Seizures. · Unusual bleeding, bruising, or weakness. · Yellowing of your skin or the whites of your eyes. If you notice these less serious side effects, talk with your doctor: · Constipation, diarrhea, or upset stomach. · Headache or dizziness. · Nausea or vomiting. If you notice other side effects that you think are caused by this medicine, tell your doctor. Call your doctor for medical advice about side effects. You may report side effects to FDA at 9-146-FDA-1088 © 2017 [address] Information is for End User's use only and may not be sold, redistributed or otherwise used for commercial purposes. The above information is an  only. It is not intended as medical advice for individual conditions or treatments. Talk to your doctor, nurse or pharmacist before following any medical regimen to see if it is safe and effective for you. Please provide this summary of care documentation to your next provider. Signatures-by signing, you are acknowledging that this After Visit Summary has been reviewed with you and you have received a copy. Patient Signature:  ____________________________________________________________ Date:  ____________________________________________________________  
  
Anusha Mealing Provider Signature:  ____________________________________________________________ Date:  ____________________________________________________________

## 2018-01-17 NOTE — PROCEDURES
[facility]  *** FINAL REPORT ***    Name: Rae Clements  MRN: [id number]    Outpatient  : 11 Aug 1945  HIS Order #: [id number]  [address] [facility] Visit #: [id number]  Date: 2018    TYPE OF TEST: Peripheral Venous Testing    REASON FOR TEST  Limb swelling    Left Leg:-  Deep venous thrombosis:           Yes  Proximal extent of thrombus:      Common Femoral  Superficial venous thrombosis:    Yes  Deep venous insufficiency:        Not examined  Superficial venous insufficiency: Not examined      INTERPRETATION/FINDINGS  Left leg :  1. Acute occlusive deep venous thrombosis identified in the common  femoral, femoral and  deep femoral veins. 2. Acute non-occlusive  deep venous thrombosis identified in the  popliteal vein. 3. Deep veins visualized include the common femoral, femoral, deep  femoral, popliteal, posterior tibial and peroneal veins. 4. Acute non-occlusive superficial venous thrombosis identified in the   great saphenous vein at the sapheno-femoral junction. No evidence of deep vein thrombosis in the contralateral common  femoral vein. 5. Acute non-occlusive superficial venous thrombosis identified in the   great saphenous vein at the sapheno-femoral junction. 6. Superficial vein visualized include the great saphenous vein. ADDITIONAL COMMENTS  Results reported to Dr. Alie Milian at [phone]. I have personally reviewed the data relevant to the interpretation of  this  study. TECHNOLOGIST: González Wade RVT  Signed: 2018 02:59 PM    PHYSICIAN: Waqar Metcalf D.O.   Signed: 2018 01:17 PM

## 2018-01-17 NOTE — ED NOTES
Pt awake alert, not in any acute distress, vital signs stable, waiting for [employer]

## 2018-01-17 NOTE — IP AVS SNAPSHOT
[facility] 
 
 
 [address] [facility] [address] [place] [address] Patient: Daisy Lares 
MRN: [id number] :1945 A check prema indicates which time of day the medication should be taken. My Medications START taking these medications Instructions Each Dose to Equal  
 Morning Noon Evening Bedtime  
 famotidine 20 mg tablet Commonly known as:  PEPCID Your last dose was: Your next dose is: Take 1 Tab by mouth two (2) times a day. 20 mg  
    
   
   
   
  
 rivaroxaban 15 mg (42)- 20 mg (9) Dspk Commonly known as:  SocialKaty EDITH Your last dose was: Your next dose is: Take one 15 mg tablet twice a day with food for the first 21 days. Then, take one 20 mg tablet once a day with food for 9 days. CONTINUE taking these medications Instructions Each Dose to Equal  
 Morning Noon Evening Bedtime BENICAR PO Your last dose was: Your next dose is: Take 20 mg by mouth daily. 1/2 tab daily 20 mg  
    
   
   
   
  
 LIPITOR PO Your last dose was: Your next dose is: Take 10 mg by mouth. 10 mg  
    
   
   
   
  
 MULTIVITAMIN PO Your last dose was: Your next dose is: Take  by mouth. SYNTHROID 75 mcg tablet Generic drug:  levothyroxine Your last dose was: Your next dose is: Take  by mouth Daily (before breakfast). VALIUM PO Your last dose was: Your next dose is: Take  by mouth. STOP taking these medications ASPIRIN PO  
   
  
  
ASK your doctor about these medications Instructions Each Dose to Equal  
 Morning Noon Evening Bedtime VALTREX 500 mg tablet Generic drug:  valACYclovir Your last dose was: Your next dose is: Take  by mouth two (2) times a day. Where to Get Your Medications These medications were sent to [facility], [address] [facility]  [address] [place] [address], [address] [place] [address] Phone:  [phone]  
  rivaroxaban 15 mg (42)- 20 mg (9) Dspk Information on where to get these meds will be given to you by the nurse or doctor. ! Ask your nurse or doctor about these medications  
  famotidine 20 mg tablet

## 2018-01-17 NOTE — ED TRIAGE NOTES
Patient states returning from [place] yesterday. States onset of shortness of breath on Thursday.

## 2018-01-18 LAB
ANION GAP SERPL CALC-SCNC: 9 MMOL/L (ref 3–18)
APTT PPP: 81.3 SEC (ref 23–36.4)
APTT PPP: 82.6 SEC (ref 23–36.4)
BASOPHILS # BLD: 0 K/UL (ref 0–0.1)
BASOPHILS NFR BLD: 0 % (ref 0–2)
BUN SERPL-MCNC: 18 MG/DL (ref 7–18)
BUN/CREAT SERPL: 13 (ref 12–20)
CALCIUM SERPL-MCNC: 8.7 MG/DL (ref 8.5–10.1)
CHLORIDE SERPL-SCNC: 101 MMOL/L (ref 100–108)
CK MB CFR SERPL CALC: 1.5 % (ref 0–4)
CK MB CFR SERPL CALC: 1.7 % (ref 0–4)
CK MB SERPL-MCNC: 1.6 NG/ML (ref 5–25)
CK MB SERPL-MCNC: 1.7 NG/ML (ref 5–25)
CK SERPL-CCNC: 111 U/L (ref 39–308)
CK SERPL-CCNC: 93 U/L (ref 39–308)
CO2 SERPL-SCNC: 29 MMOL/L (ref 21–32)
CREAT SERPL-MCNC: 1.36 MG/DL (ref 0.6–1.3)
DIFFERENTIAL METHOD BLD: ABNORMAL
EOSINOPHIL # BLD: 0.3 K/UL (ref 0–0.4)
EOSINOPHIL NFR BLD: 4 % (ref 0–5)
ERYTHROCYTE [DISTWIDTH] IN BLOOD BY AUTOMATED COUNT: 13 % (ref 11.6–14.5)
GLUCOSE SERPL-MCNC: 102 MG/DL (ref 74–99)
HCT VFR BLD AUTO: 43.9 % (ref 36–48)
HGB BLD-MCNC: 14.6 G/DL (ref 13–16)
INR PPP: 1.1 (ref 0.8–1.2)
LACTATE SERPL-SCNC: 1.1 MMOL/L (ref 0.4–2)
LYMPHOCYTES # BLD: 2 K/UL (ref 0.9–3.6)
LYMPHOCYTES NFR BLD: 23 % (ref 21–52)
MCH RBC QN AUTO: 32.2 PG (ref 24–34)
MCHC RBC AUTO-ENTMCNC: 33.3 G/DL (ref 31–37)
MCV RBC AUTO: 96.9 FL (ref 74–97)
MONOCYTES # BLD: 1 K/UL (ref 0.05–1.2)
MONOCYTES NFR BLD: 11 % (ref 3–10)
NEUTS SEG # BLD: 5.3 K/UL (ref 1.8–8)
NEUTS SEG NFR BLD: 62 % (ref 40–73)
PLATELET # BLD AUTO: 233 K/UL (ref 135–420)
PMV BLD AUTO: 9.7 FL (ref 9.2–11.8)
POTASSIUM SERPL-SCNC: 4 MMOL/L (ref 3.5–5.5)
PROTHROMBIN TIME: 13.9 SEC (ref 11.5–15.2)
RBC # BLD AUTO: 4.53 M/UL (ref 4.7–5.5)
SODIUM SERPL-SCNC: 139 MMOL/L (ref 136–145)
TROPONIN I SERPL-MCNC: 0.07 NG/ML (ref 0–0.04)
TROPONIN I SERPL-MCNC: 0.09 NG/ML (ref 0–0.04)
WBC # BLD AUTO: 8.5 K/UL (ref 4.6–13.2)

## 2018-01-18 PROCEDURE — 83605 ASSAY OF LACTIC ACID: CPT | Performed by: INTERNAL MEDICINE

## 2018-01-18 PROCEDURE — 74011250637 HC RX REV CODE- 250/637: Performed by: HOSPITALIST

## 2018-01-18 PROCEDURE — 85732 THROMBOPLASTIN TIME PARTIAL: CPT | Performed by: INTERNAL MEDICINE

## 2018-01-18 PROCEDURE — 93306 TTE W/DOPPLER COMPLETE: CPT

## 2018-01-18 PROCEDURE — 86147 CARDIOLIPIN ANTIBODY EA IG: CPT | Performed by: INTERNAL MEDICINE

## 2018-01-18 PROCEDURE — 85730 THROMBOPLASTIN TIME PARTIAL: CPT | Performed by: INTERNAL MEDICINE

## 2018-01-18 PROCEDURE — 74011000250 HC RX REV CODE- 250: Performed by: INTERNAL MEDICINE

## 2018-01-18 PROCEDURE — 80048 BASIC METABOLIC PNL TOTAL CA: CPT | Performed by: INTERNAL MEDICINE

## 2018-01-18 PROCEDURE — 85610 PROTHROMBIN TIME: CPT | Performed by: INTERNAL MEDICINE

## 2018-01-18 PROCEDURE — 85025 COMPLETE CBC W/AUTO DIFF WBC: CPT | Performed by: INTERNAL MEDICINE

## 2018-01-18 PROCEDURE — 94640 AIRWAY INHALATION TREATMENT: CPT

## 2018-01-18 PROCEDURE — 74011250637 HC RX REV CODE- 250/637: Performed by: NURSE PRACTITIONER

## 2018-01-18 PROCEDURE — 85303 CLOT INHIBIT PROT C ACTIVITY: CPT | Performed by: INTERNAL MEDICINE

## 2018-01-18 PROCEDURE — 82550 ASSAY OF CK (CPK): CPT | Performed by: INTERNAL MEDICINE

## 2018-01-18 PROCEDURE — 36415 COLL VENOUS BLD VENIPUNCTURE: CPT | Performed by: INTERNAL MEDICINE

## 2018-01-18 PROCEDURE — 74011250636 HC RX REV CODE- 250/636: Performed by: EMERGENCY MEDICINE

## 2018-01-18 PROCEDURE — 74011250637 HC RX REV CODE- 250/637: Performed by: INTERNAL MEDICINE

## 2018-01-18 PROCEDURE — 85305 CLOT INHIBIT PROT S TOTAL: CPT | Performed by: INTERNAL MEDICINE

## 2018-01-18 PROCEDURE — 85598 HEXAGNAL PHOSPH PLTLT NEUTRL: CPT | Performed by: INTERNAL MEDICINE

## 2018-01-18 PROCEDURE — 81241 F5 GENE: CPT | Performed by: INTERNAL MEDICINE

## 2018-01-18 PROCEDURE — 85670 THROMBIN TIME PLASMA: CPT | Performed by: INTERNAL MEDICINE

## 2018-01-18 PROCEDURE — 74011250636 HC RX REV CODE- 250/636: Performed by: INTERNAL MEDICINE

## 2018-01-18 PROCEDURE — 65270000029 HC RM PRIVATE

## 2018-01-18 RX ORDER — OLMESARTAN MEDOXOMIL 5 MG/1
10 TABLET ORAL DAILY
Status: DISCONTINUED | OUTPATIENT
Start: 2018-01-18 | End: 2018-01-19 | Stop reason: HOSPADM

## 2018-01-18 RX ORDER — FAMOTIDINE 20 MG/1
20 TABLET, FILM COATED ORAL 2 TIMES DAILY
Status: DISCONTINUED | OUTPATIENT
Start: 2018-01-18 | End: 2018-01-19 | Stop reason: HOSPADM

## 2018-01-18 RX ORDER — LEVOTHYROXINE SODIUM 75 UG/1
75 TABLET ORAL
Status: DISCONTINUED | OUTPATIENT
Start: 2018-01-18 | End: 2018-01-19 | Stop reason: HOSPADM

## 2018-01-18 RX ORDER — ATORVASTATIN CALCIUM 10 MG/1
10 TABLET, FILM COATED ORAL
Status: DISCONTINUED | OUTPATIENT
Start: 2018-01-18 | End: 2018-01-19 | Stop reason: HOSPADM

## 2018-01-18 RX ADMIN — ATORVASTATIN CALCIUM 10 MG: 10 TABLET, FILM COATED ORAL at 22:39

## 2018-01-18 RX ADMIN — Medication 10 ML: at 10:00

## 2018-01-18 RX ADMIN — LEVOFLOXACIN 750 MG: 5 INJECTION, SOLUTION INTRAVENOUS at 00:41

## 2018-01-18 RX ADMIN — Medication 10 ML: at 14:00

## 2018-01-18 RX ADMIN — LEVOTHYROXINE SODIUM 75 MCG: 75 TABLET ORAL at 10:49

## 2018-01-18 RX ADMIN — FAMOTIDINE 20 MG: 20 TABLET, FILM COATED ORAL at 18:40

## 2018-01-18 RX ADMIN — Medication 10 ML: at 00:42

## 2018-01-18 RX ADMIN — HEPARIN SODIUM AND DEXTROSE 15 UNITS/KG/HR: 10000; 5 INJECTION INTRAVENOUS at 07:50

## 2018-01-18 RX ADMIN — LEVOFLOXACIN 750 MG: 5 INJECTION, SOLUTION INTRAVENOUS at 22:39

## 2018-01-18 RX ADMIN — Medication 10 ML: at 22:42

## 2018-01-18 RX ADMIN — ACETAMINOPHEN 650 MG: 325 TABLET ORAL at 18:40

## 2018-01-18 RX ADMIN — ALBUTEROL SULFATE 2.5 MG: 2.5 SOLUTION RESPIRATORY (INHALATION) at 21:08

## 2018-01-18 RX ADMIN — Medication 10 ML: at 10:50

## 2018-01-18 RX ADMIN — FAMOTIDINE 20 MG: 20 TABLET, FILM COATED ORAL at 12:46

## 2018-01-18 RX ADMIN — ALBUTEROL SULFATE 2.5 MG: 2.5 SOLUTION RESPIRATORY (INHALATION) at 23:59

## 2018-01-18 RX ADMIN — ALBUTEROL SULFATE 2.5 MG: 2.5 SOLUTION RESPIRATORY (INHALATION) at 10:04

## 2018-01-18 RX ADMIN — OLMESARTAN MEDOXOMIL 10 MG: 5 TABLET, COATED ORAL at 10:49

## 2018-01-18 NOTE — CONSULTS
Pulmonary Consultation  Requesting Clinician:  Latisha Mcmillan NP  Indication:  PE    History  The patient is a 80 y/o male that travels to [facility] at least 4 times a year and regularly works at a desk. He presented to the ER on 1/17 with a 3 day history of leg pain and dyspnea. At his PCP's office, his O2 sats were 89%, which prompted an ER evaluation. Chest CT showed significant clot burden in both pulmonary arteries, but no saddle embolus. Left LE doppler showed extensive acute and occlusive clot in the common femoral, femoral and deep femoral veins. More distal LLE veins showed multiple areas of non-occlusive thrombus. Hemodynamically he was never hypotensive, persistently tachycardic, or saturating less than 92% on RA while in the ER. He had minor oral surgery 2 days before onset of symptoms. He denies LE injury. There is no family history of clotting disorders. In 2/2017 the patient bought ill-fitting hiking boots. He then developed problems with left LE edema. The problems were intermittent and would resolve with elevation. Adjustments to the boots allowed them to fit better, but the swelling did not resolve. 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 R/O/S  No fevers, chills, night sweats or weight loss. No cough or sputum production. In the past he was more physically active and initially attributed some of his shortness of breath to deconditioning.   The review of systems was completed in its entirety and is otherwise normal.    Past Medical History  Hypothyroidism  Hypertension  Hypercholesterolemia   system stones    No Known Allergies    Current Facility-Administered Medications on File Prior to Encounter   Medication Dose Route Frequency Provider Last Rate Last Dose    [COMPLETED] iopamidol (ISOVUE 300) 61 % contrast injection 80 mL  80 mL IntraVENous RAD ONCE Tolu Spaulding MD   80 mL at 01/17/18 1500     Current Outpatient Prescriptions on File Prior to Encounter   Medication Sig Dispense Refill    ATORVASTATIN CALCIUM (LIPITOR PO) Take 10 mg by mouth.  OLMESARTAN MEDOXOMIL (BENICAR PO) Take 20 mg by mouth daily. 1/2 tab daily        DIAZEPAM (VALIUM PO) Take  by mouth.  valacyclovir (VALTREX) 500 mg tablet Take  by mouth two (2) times a day.  MULTIVITAMIN PO Take  by mouth.  ASPIRIN PO Take 81 mg by mouth daily. Social History  Retired . Currently works in [employer] for [employer]. Former smoker and quit at least 30 years ago. Family History  There is no history of clotting disorders. Exam  Alert and oriented. No respiratory distress at rest.  Normal affect  Blood pressure 133/79, pulse 99, temperature 98.2 °F (36.8 °C), resp. rate 20, height 5' 10.5\" (1.791 m), weight 85.6 kg (188 lb 11.4 oz), SpO2 93 %. EOMI, sclera anicteric. Oral mucosa moist  Neck supple. No LAD or JVD  CTA  RRR with murmur along lower left sternal border that increases with exhalation  Soft, ND  No RLE edema. Pitting LLE edema. No gross neuro deficit    Labs: WBC 10.2, Hgb 14.3, Plt 225   Presentation INR 1.0 and aPTT 34.3   BUN 18, Cr 1.36   Troponin 0.09 highest value    I personally reviewed and interpreted the patient's chest CT from 1/17/18. There are the above described PEs. There are wedge and rounded shaped peripheral infiltrates c/w pulmonary infarctions from PE. No significant mediastinal LAD. No pleural effusions    Assessment  Extensive venous thromboembolic disease  Subendocardial ischemia secondary to above  Mild renal insufficiency, old    Plan  Echocardiogram  Oral anticoagulation  No indication for lytics  Repeat chest CT and echocardiogram in 2 weeks and 3 months  Hypercoagulable states screening. The indication is that the patient will continue his traveling and will be at a higher risk for recurrence.     The plan and rationale was discussed with the patient and his wife at great length. Thank you for this consultation on this pleasant gentleman.

## 2018-01-18 NOTE — PROGRESS NOTES
[facility] Hospitalist Group  Progress Note    Patient: Tom Lopez Age: 81 y.o. : 1945 MR#: [id number] SSN: [id number]  Date: 2018     Subjective:     Reports shortness of breath with exertion x 1 week duration and onset of left leg swelling x 3 days. Denies shortness of breath at rest, no chest pain with exertion but relates presence of L>R sided chest pain / muscle pain which patient believes r/t carrying tiles around during traveling (he feels MS related). Denies n/v, no flank pain or dysuria + ocassional constipation but none currently. Assessment/Plan:   1. Bilateral Pulmonary emboli / Left leg DVT - cont heparin drip, discussed with patient regarding oral anticoagulation in anticipation of start (not started yet). Clots provoked by extensive travel. 2.  Possible pneumonia - continue levaquin for now but no fevers / leukocytosis. De-escalate as able. 3.  Mild elevation in troponin  - follow trends, recently ordered for stress test by PCP in workup for current illness. Mild elevation likely r/t strain from PE's. Low suspicion for ACS  4.   GERD - start pepcid per patient request    Additional Notes:      Case discussed with:  [x]Patient  [x]Family  [x]Nursing  []Case Management  DVT Prophylaxis:  []Lovenox  []Hep SQ  []SCDs  []Coumadin   [x]On Heparin gtt    Objective:   VS:   Visit Vitals    /89 (BP 1 Location: Left arm, BP Patient Position: At rest)    Pulse 92    Temp 98.6 °F (37 °C)    Resp 18    Ht 5' 10.5\" (1.791 m)    Wt 85.6 kg (188 lb 11.4 oz)    SpO2 95%    BMI 26.69 kg/m2      Tmax/24hrs: Temp (24hrs), Av.4 °F (36.9 °C), Min:97.6 °F (36.4 °C), Max:98.9 °F (37.2 °C)    Intake/Output Summary (Last 24 hours) at 18 1043  Last data filed at 18 0349   Gross per 24 hour   Intake                0 ml   Output              300 ml   Net             -300 ml       General:  Alert, NAD  Cardiovascular:  RRR  Pulmonary:  LSC throughout; respiratory effort WNL  GI:  +BS in all four quadrants, soft, non-tender  Extremities:  +LLE; 2+ dorsalis pedis pulses bilaterally  Neuro: oriented x 4    Family contact: Juli Mckoy Day [phone]    Labs:    Recent Results (from the past 24 hour(s))   POC CREATININE    Collection Time: 01/17/18  2:06 PM   Result Value Ref Range    Creatinine, POC 1.5 (H) 0.6 - 1.3 MG/DL    GFRAA, POC 56 (L) >60 ml/min/1.73m2    GFRNA, POC 46 (L) >60 ml/min/1.73m2   CBC WITH AUTOMATED DIFF    Collection Time: 01/17/18  3:15 PM   Result Value Ref Range    WBC 10.2 4.6 - 13.2 K/uL    RBC 4.58 (L) 4.70 - 5.50 M/uL    HGB 14.3 13.0 - 16.0 g/dL    HCT 44.9 36.0 - 48.0 %    MCV 98.0 (H) 74.0 - 97.0 FL    MCH 31.2 24.0 - 34.0 PG    MCHC 31.8 31.0 - 37.0 g/dL    RDW 13.2 11.6 - 14.5 %    PLATELET 937 795 - 155 K/uL    MPV 9.5 9.2 - 11.8 FL    NEUTROPHILS 61 40 - 73 %    LYMPHOCYTES 24 21 - 52 %    MONOCYTES 12 (H) 3 - 10 %    EOSINOPHILS 3 0 - 5 %    BASOPHILS 0 0 - 2 %    ABS. NEUTROPHILS 6.2 1.8 - 8.0 K/UL    ABS. LYMPHOCYTES 2.5 0.9 - 3.6 K/UL    ABS. MONOCYTES 1.2 0.05 - 1.2 K/UL    ABS. EOSINOPHILS 0.3 0.0 - 0.4 K/UL    ABS. BASOPHILS 0.0 0.0 - 0.06 K/UL    DF AUTOMATED     METABOLIC PANEL, COMPREHENSIVE    Collection Time: 01/17/18  3:15 PM   Result Value Ref Range    Sodium 138 136 - 145 mmol/L    Potassium 4.3 3.5 - 5.5 mmol/L    Chloride 99 (L) 100 - 108 mmol/L    CO2 30 21 - 32 mmol/L    Anion gap 9 3.0 - 18 mmol/L    Glucose 95 74 - 99 mg/dL    BUN 19 (H) 7.0 - 18 MG/DL    Creatinine 1.52 (H) 0.6 - 1.3 MG/DL    BUN/Creatinine ratio 13 12 - 20      GFR est AA 55 (L) >60 ml/min/1.73m2    GFR est non-AA 46 (L) >60 ml/min/1.73m2    Calcium 9.5 8.5 - 10.1 MG/DL    Bilirubin, total 0.9 0.2 - 1.0 MG/DL    ALT (SGPT) 32 16 - 61 U/L    AST (SGOT) 39 (H) 15 - 37 U/L    Alk.  phosphatase 70 45 - 117 U/L    Protein, total 6.9 6.4 - 8.2 g/dL    Albumin 3.5 3.4 - 5.0 g/dL    Globulin 3.4 2.0 - 4.0 g/dL    A-G Ratio 1.0 0.8 - 1.7 PROTHROMBIN TIME + INR    Collection Time: 01/17/18  3:15 PM   Result Value Ref Range    Prothrombin time 12.6 11.5 - 15.2 sec    INR 1.0 0.8 - 1.2     PTT    Collection Time: 01/17/18  3:15 PM   Result Value Ref Range    aPTT 34.3 23.0 - 36.4 SEC   CARDIAC PANEL,(CK, CKMB & TROPONIN)    Collection Time: 01/17/18  3:15 PM   Result Value Ref Range     39 - 308 U/L    CK - MB 3.9 (H) <3.6 ng/ml    CK-MB Index 1.8 0.0 - 4.0 %    Troponin-I, Qt. 0.08 (H) 0.00 - 0.06 NG/ML   EKG, 12 LEAD, SUBSEQUENT    Collection Time: 01/17/18  3:29 PM   Result Value Ref Range    Ventricular Rate 85 BPM    Atrial Rate 85 BPM    P-R Interval 184 ms    QRS Duration 96 ms    Q-T Interval 374 ms    QTC Calculation (Bezet) 445 ms    Calculated P Axis 48 degrees    Calculated R Axis -15 degrees    Calculated T Axis 31 degrees    Diagnosis       Normal sinus rhythm  Septal infarct , age undetermined  Possible Lateral infarct , age undetermined  Abnormal ECG     PTT    Collection Time: 01/17/18  9:04 PM   Result Value Ref Range    aPTT 142.2 (H) 23.0 - 84.5 SEC   METABOLIC PANEL, BASIC    Collection Time: 01/18/18  2:59 AM   Result Value Ref Range    Sodium 139 136 - 145 mmol/L    Potassium 4.0 3.5 - 5.5 mmol/L    Chloride 101 100 - 108 mmol/L    CO2 29 21 - 32 mmol/L    Anion gap 9 3.0 - 18 mmol/L    Glucose 102 (H) 74 - 99 mg/dL    BUN 18 7.0 - 18 MG/DL    Creatinine 1.36 (H) 0.6 - 1.3 MG/DL    BUN/Creatinine ratio 13 12 - 20      GFR est AA >60 >60 ml/min/1.73m2    GFR est non-AA 52 (L) >60 ml/min/1.73m2    Calcium 8.7 8.5 - 10.1 MG/DL   CBC WITH AUTOMATED DIFF    Collection Time: 01/18/18  2:59 AM   Result Value Ref Range    WBC 8.5 4.6 - 13.2 K/uL    RBC 4.53 (L) 4.70 - 5.50 M/uL    HGB 14.6 13.0 - 16.0 g/dL    HCT 43.9 36.0 - 48.0 %    MCV 96.9 74.0 - 97.0 FL    MCH 32.2 24.0 - 34.0 PG    MCHC 33.3 31.0 - 37.0 g/dL    RDW 13.0 11.6 - 14.5 %    PLATELET 937 493 - 775 K/uL    MPV 9.7 9.2 - 11.8 FL    NEUTROPHILS 62 40 - 73 % LYMPHOCYTES 23 21 - 52 %    MONOCYTES 11 (H) 3 - 10 %    EOSINOPHILS 4 0 - 5 %    BASOPHILS 0 0 - 2 %    ABS. NEUTROPHILS 5.3 1.8 - 8.0 K/UL    ABS. LYMPHOCYTES 2.0 0.9 - 3.6 K/UL    ABS. MONOCYTES 1.0 0.05 - 1.2 K/UL    ABS. EOSINOPHILS 0.3 0.0 - 0.4 K/UL    ABS.  BASOPHILS 0.0 0.0 - 0.1 K/UL    DF AUTOMATED     PROTHROMBIN TIME + INR    Collection Time: 01/18/18  2:59 AM   Result Value Ref Range    Prothrombin time 13.9 11.5 - 15.2 sec    INR 1.1 0.8 - 1.2     LACTIC ACID    Collection Time: 01/18/18  2:59 AM   Result Value Ref Range    Lactic acid 1.1 0.4 - 2.0 MMOL/L   PTT    Collection Time: 01/18/18  2:59 AM   Result Value Ref Range    aPTT 81.3 (H) 23.0 - 36.4 SEC   CARDIAC PANEL,(CK, CKMB & TROPONIN)    Collection Time: 01/18/18  2:59 AM   Result Value Ref Range     39 - 308 U/L    CK - MB 1.7 <3.6 ng/ml    CK-MB Index 1.5 0.0 - 4.0 %    Troponin-I, Qt. 0.09 (H) 0.0 - 0.045 NG/ML     Signed By: Neisha Borden NP     January 18, 2018

## 2018-01-18 NOTE — H&P
History & Physical    Patient: Phyllis Barrera MRN: [id number]  CSN: [id number]    YOB: 1945  Age: 81 y.o. Sex: male      DOA: 1/17/2018    Chief Complaint:   Chief Complaint   Patient presents with    Shortness of Breath          HPI:     Phyllis Barrera is a 81 y.o.  male who presents with 3day hx of leg swelling and dyspnea recenlty came home from [facility] to visit daughter who is having a baby sent to ER by daughter due to leg swelling and clot she is a Vet. Patient had severe illness with cough and congstion 2 weeks ago diagnosed with kidney infection there sent home on antibiotics in last 3 months travelled to [facility], [facility], and [place]  Has hx of Agent orange no recent orthopedic surgery or trauma  2 weeks ago also had oral surgery . At PCP office pulse ox was 89 percent so sent for stat CT and ultrasound found to have pulmonary embolism bilaterally with possible infarct   All other ROS negative       Past Medical History:   Diagnosis Date    Calculus of kidney     Calculus of ureter     History of vasectomy     Hydrocele, unspecified     Enlarged left hydrocele    Hypertension     Hypothyroidism     Multiple lipomas     right arm and forearm       Past Surgical History:   Procedure Laterality Date    HX HERNIA REPAIR  1952    HX OTHER SURGICAL  2/11/11    Left hydrocelectomy    HX OTHER SURGICAL Right 10/7/13    Excision of lipomas arm and forearm    REMOVAL OF KIDNEY STONE  3/24/11    Ureteroscopic stone extraction with stent placement. History reviewed. No pertinent family history.     Social History     Social History    Marital status:      Spouse name: N/A    Number of children: N/A    Years of education: N/A     Social History Main Topics    Smoking status: Former Smoker    Smokeless tobacco: None    Alcohol use No    Drug use: None    Sexual activity: Not Asked     Other Topics Concern    None     Social History Narrative       Prior to Admission medications    Medication Sig Start Date End Date Taking? Authorizing Provider   levothyroxine (SYNTHROID) 75 mcg tablet Take  by mouth Daily (before breakfast). Yes Phys Other, MD   ATORVASTATIN CALCIUM (LIPITOR PO) Take 10 mg by mouth. Historical Provider   OLMESARTAN MEDOXOMIL (BENICAR PO) Take 20 mg by mouth. 1/2 tab daily      Historical Provider   DIAZEPAM (VALIUM PO) Take  by mouth. Historical Provider   valacyclovir (VALTREX) 500 mg tablet Take  by mouth two (2) times a day. Historical Provider   MULTIVITAMIN PO Take  by mouth. Historical Provider   ASPIRIN PO Take 81 mg by mouth daily. Historical Provider       No Known Allergies      Review of Systems  GENERAL: Patient alert, awake and oriented times 3, able to communicate full sentences and not in distress. HEENT: No change in vision, no earache, tinnitus, sore throat or sinus congestion. NECK: No pain or stiffness. PULMONARY:+shortness of breath, cough or wheeze. Cardiovascular: no pnd or orthopnea+ CP  GASTROINTESTINAL: No abdominal pain, nausea, vomiting or diarrhea, melena or bright red blood per rectum. GENITOURINARY: No urinary frequency, urgency, hesitancy or dysuria. MUSCULOSKELETAL: No joint or muscle pain, no back pain, no recent trauma. Left leg swelling recent travel lipoma   DERMATOLOGIC: No rash, no itching, no lesions. ENDOCRINE: No polyuria, polydipsia, no heat or cold intolerance. No recent change in weight. HEMATOLOGICAL: No anemia or easy bruising or bleeding. NEUROLOGIC: No headache, seizures, numbness, tingling or weakness.        Physical Exam:     Physical Exam:  Visit Vitals    /90 (BP 1 Location: Left arm, BP Patient Position: At rest)    Pulse 91    Temp 98.9 °F (37.2 °C)    Resp 18    Ht 5' 10.5\" (1.791 m)    Wt 85.3 kg (188 lb)    SpO2 95%    BMI 26.59 kg/m2      O2 Device: Room air    Temp (24hrs), Av.7 °F (37.1 °C), Min:98.5 °F (36.9 °C), Max:98.9 °F (37.2 °C) General:  Alert, cooperative, no distress, appears stated age. Head: Normocephalic, without obvious abnormality, atraumatic. Eyes:  Conjunctivae/corneas clear. PERRL, EOMs intact. Nose: Nares normal. No drainage or sinus tenderness. Neck: Supple, symmetrical, trachea midline, no adenopathy, thyroid: no enlargement, no carotid bruit and no JVD. Lungs:   Clear to auscultation bilaterally. Heart:  Regular rate and rhythm, S1, S2 normal.     Abdomen: Soft, non-tender. Bowel sounds normal.    Extremities: Extremities normal, atraumatic, + cyanosis + edema. Left greater than right palpable lipoma across left femoral area    Pulses: 2+ and symmetric all extremities. Skin:  No rashes or lesions   Neurologic: AAOx3, No focal motor or sensory deficit. Labs Reviewed:  CT Results  (Last 48 hours)               01/17/18 1417  CT CHEST W CONT Final result    Impression:  IMPRESSION:        1. Moderate to large burden of acute pulmonary emboli in bilateral lower lobes   with complete to near complete occlusion.   -Patient was informed and taken to ER registration by the technologist. Also   discussed with the ER physician Dr. Kasey Gusman. 2. Lingular consolidative opacities with air bronchogram likely pulmonary   infarct versus infection. 3. Plaque-like pleural thickening or loculated fluid in the lateral right   midlung measuring 3.6 x 1.2 cm (Hounsfield unit 17), probably loculated fluid. 4. Trace left pleural effusion. Narrative:  CT CHEST WITHOUT IV CONTRAST           COMPARISON: None. INDICATIONS: Shortness of breath. TECHNIQUE: Volumetric data acquisition was performed through the chest with a   multislice scanner. Reconstructions were created in the axial, coronal, and   sagittal planes.    All CT scans at this facility are performed using dose optimization technique as   appropriate to the performed exam, to include automated exposure control,   adjustment of the mA and/or kV according to patient's size (Including   appropriate matching for site-specific examinations), or use of iterative   reconstruction technique. FINDINGS:        Thyroid/Base Of Neck: Unremarkable. Lungs:    Trachea and central bronchi patent. Lingular opacification with air bronchogram. Right basilar subpleural   reticulation and groundglass opacities. Mild left basilar dependent atelectasis. Pleural Spaces:   Trace left pleural effusion. No pneumothorax. Plaque-like pleural thickening or loculated fluid in the lateral right midlung   measuring 3.6 x 1.2 cm (Hounsfield unit 17), probably loculated fluid       Lymph Nodes:    Axillae: No enlargement. Mediastinum / Elsy: No enlargement. Mediastinum, Great Vessels And Heart: The heart is not enlarged. No pericardial effusion new. No aortic aneurysmal dilatation or or dissection. Filling defects at the   branching level of the left main pulmonary artery with extension into the left   lower lobe segmental branches and lingular branches with near complete   occlusion. Also occlusive emboli in the right lower lobar artery and segmental   branches. Abdomen Structures Included:   No incidental abnormality. .       Osseous Structures:    No destructive osseous process. All lab results for the last 24 hours reviewed.    and EKG    Procedures/imaging: see electronic medical records for all procedures/Xrays and details which were not copied into this note but were reviewed prior to creation of Plan    Recent Results (from the past 24 hour(s))   POC CREATININE    Collection Time: 01/17/18  2:06 PM   Result Value Ref Range    Creatinine, POC 1.5 (H) 0.6 - 1.3 MG/DL    GFRAA, POC 56 (L) >60 ml/min/1.73m2    GFRNA, POC 46 (L) >60 ml/min/1.73m2   CBC WITH AUTOMATED DIFF    Collection Time: 01/17/18  3:15 PM   Result Value Ref Range    WBC 10.2 4.6 - 13.2 K/uL    RBC 4.58 (L) 4.70 - 5.50 M/uL    HGB 14.3 13.0 - 16.0 g/dL HCT 44.9 36.0 - 48.0 %    MCV 98.0 (H) 74.0 - 97.0 FL    MCH 31.2 24.0 - 34.0 PG    MCHC 31.8 31.0 - 37.0 g/dL    RDW 13.2 11.6 - 14.5 %    PLATELET 991 632 - 048 K/uL    MPV 9.5 9.2 - 11.8 FL    NEUTROPHILS 61 40 - 73 %    LYMPHOCYTES 24 21 - 52 %    MONOCYTES 12 (H) 3 - 10 %    EOSINOPHILS 3 0 - 5 %    BASOPHILS 0 0 - 2 %    ABS. NEUTROPHILS 6.2 1.8 - 8.0 K/UL    ABS. LYMPHOCYTES 2.5 0.9 - 3.6 K/UL    ABS. MONOCYTES 1.2 0.05 - 1.2 K/UL    ABS. EOSINOPHILS 0.3 0.0 - 0.4 K/UL    ABS. BASOPHILS 0.0 0.0 - 0.06 K/UL    DF AUTOMATED     METABOLIC PANEL, COMPREHENSIVE    Collection Time: 01/17/18  3:15 PM   Result Value Ref Range    Sodium 138 136 - 145 mmol/L    Potassium 4.3 3.5 - 5.5 mmol/L    Chloride 99 (L) 100 - 108 mmol/L    CO2 30 21 - 32 mmol/L    Anion gap 9 3.0 - 18 mmol/L    Glucose 95 74 - 99 mg/dL    BUN 19 (H) 7.0 - 18 MG/DL    Creatinine 1.52 (H) 0.6 - 1.3 MG/DL    BUN/Creatinine ratio 13 12 - 20      GFR est AA 55 (L) >60 ml/min/1.73m2    GFR est non-AA 46 (L) >60 ml/min/1.73m2    Calcium 9.5 8.5 - 10.1 MG/DL    Bilirubin, total 0.9 0.2 - 1.0 MG/DL    ALT (SGPT) 32 16 - 61 U/L    AST (SGOT) 39 (H) 15 - 37 U/L    Alk.  phosphatase 70 45 - 117 U/L    Protein, total 6.9 6.4 - 8.2 g/dL    Albumin 3.5 3.4 - 5.0 g/dL    Globulin 3.4 2.0 - 4.0 g/dL    A-G Ratio 1.0 0.8 - 1.7     PROTHROMBIN TIME + INR    Collection Time: 01/17/18  3:15 PM   Result Value Ref Range    Prothrombin time 12.6 11.5 - 15.2 sec    INR 1.0 0.8 - 1.2     PTT    Collection Time: 01/17/18  3:15 PM   Result Value Ref Range    aPTT 34.3 23.0 - 36.4 SEC   CARDIAC PANEL,(CK, CKMB & TROPONIN)    Collection Time: 01/17/18  3:15 PM   Result Value Ref Range     39 - 308 U/L    CK - MB 3.9 (H) <3.6 ng/ml    CK-MB Index 1.8 0.0 - 4.0 %    Troponin-I, Qt. 0.08 (H) 0.00 - 0.06 NG/ML   EKG, 12 LEAD, SUBSEQUENT    Collection Time: 01/17/18  3:29 PM   Result Value Ref Range    Ventricular Rate 85 BPM    Atrial Rate 85 BPM    P-R Interval 184 ms QRS Duration 96 ms    Q-T Interval 374 ms    QTC Calculation (Bezet) 445 ms    Calculated P Axis 48 degrees    Calculated R Axis -15 degrees    Calculated T Axis 31 degrees    Diagnosis       Normal sinus rhythm  Septal infarct , age undetermined  Possible Lateral infarct , age undetermined  Abnormal ECG     PTT    Collection Time: 01/17/18  9:04 PM   Result Value Ref Range    aPTT 142.2 (H) 23.0 - 36.4 SEC     Assessment/Plan     Active Problems:    Dyspnea (1/17/2018)      Bilateral pulmonary embolism (HCC) (1/17/2018)      Leg DVT (deep venous thromboembolism), acute, left (HCC) (1/17/2018)     Left Plerual effusion possible Pneumonia    Mild Troponin increase   Will check echo and serial enzymes   Plan:  Heparin drip for now  Good eloquis candidate  levaquin for Pneumonia  Pulmonary toilet     DVT/GI Prophylaxis: heparin    Discussed with patient at bedside about hospital admission and my plan care, who understood and agree with my plan care.     Lorenzo Trinidad MD  1/17/2018 10:51 PM

## 2018-01-18 NOTE — PROGRESS NOTES
Mr. Zuleta and I had a nice visit about his life and his condition. I encouraged him to call us at any time.  conducted an initial consultation and Spiritual Assessment for Phuc Newton, who is a 81 y.o.,male. Patients Primary Language is: [place]. According to the patients EMR Yazdanism Affiliation is: [facility].     The reason the Patient came to the hospital is:   Patient Active Problem List    Diagnosis Date Noted    Dyspnea 01/17/2018    Bilateral pulmonary embolism ([address] 01/17/2018    Leg DVT (deep venous thromboembolism), acute, left ([address] 01/17/2018    Pneumonia 01/17/2018    Pulmonary embolism ([address] 01/17/2018    Troponin I above reference range 01/17/2018    Calculus of kidney         The  provided the following Interventions:  Initiated a relationship of care and support. Explored issues of angelita, belief, spirituality and Buddhism/ritual needs while hospitalized. Listened empathically. Provided chaplaincy education. Provided information about Spiritual Care Services. Offered prayer and assurance of continued prayers on patient's behalf. Chart reviewed. The following outcomes where achieved:  Patient shared limited information about both their medical narrative and spiritual journey/beliefs.  confirmed Patient's Yazdanism Affiliation. Patient processed feeling about current hospitalization. Patient expressed gratitude for 's visit. Assessment:  Patient does not have any Buddhism/cultural needs that will affect patients preferences in health care. There are no spiritual or Buddhism issues which require  [place] Hart M.Div, CPE Resident   Pager: [phone]  Phone: [phone] at this time. Plan:  Chaplains will continue to follow and will provide pastoral care on an as needed/requested basis.  recommends bedside caregivers page  on duty if patient shows signs of acute spiritual or emotional distress. [address] [facility] HospitalPeggy, CPE Resident   Pager: [phone]  Phone: [phone]

## 2018-01-18 NOTE — ROUTINE PROCESS
Bedside and Verbal shift change report given to Maria R Madden RN (oncoming nurse) by Nikki Solis RN (offgoing nurse). Report included the following information SBAR, Kardex, MAR and Recent Results.     SITUATION:    Code Status: Full Code   Reason for Admission: Bilateral pulmonary embolism ([facility]   Leg DVT (deep venous thromboembolism), acute, left (HCC)   Dyspnea   Pulmonary embolism (HCC)   Troponin I above reference range   Pneumonia    [facility] day: 1   Problem List:       Hospital Problems  Date Reviewed: 5/20/2011          Codes Class Noted POA    Dyspnea ICD-10-CM: R06.00  ICD-9-CM: 786.09  1/17/2018 Unknown        Bilateral pulmonary embolism ([facility] ICD-10-CM: I26.99  ICD-9-CM: 415.19  1/17/2018 Unknown        Leg DVT (deep venous thromboembolism), acute, left ([facility] ICD-10-CM: I82.402  ICD-9-CM: 453.40  1/17/2018 Unknown        Pneumonia ICD-10-CM: J18.9  ICD-9-CM: 489  1/17/2018 Unknown        Pulmonary embolism ([facility] ICD-10-CM: I26.99  ICD-9-CM: 415.19  1/17/2018 Unknown        Troponin I above reference range ICD-10-CM: R74.8  ICD-9-CM: 790.6  1/17/2018 Unknown              BACKGROUND:    Past Medical History:   Past Medical History:   Diagnosis Date    Calculus of kidney     Calculus of ureter     History of vasectomy     Hydrocele, unspecified     Enlarged left hydrocele    Hypertension     Hypothyroidism     Multiple lipomas     right arm and forearm         Patient taking anticoagulants yes, on heparin drip    ASSESSMENT:    Changes in Assessment Throughout Shift: none     Patient has Central Line: no Reasons if yes: n/a   Patient has [place] Cath: no Reasons if yes: n/a      Last Vitals:     Vitals:    01/17/18 2000 01/18/18 0000 01/18/18 0400 01/18/18 0704   BP: 148/90 133/82 140/87    Pulse: 91 100 93    Resp: 18 18 18    Temp: 98.9 °F (37.2 °C) 97.6 °F (36.4 °C) 98.4 °F (36.9 °C)    SpO2: 95% 92% 94%    Weight:    85.6 kg (188 lb 11.4 oz)   Height:            IV and DRAINS (will only show if present)   Peripheral IV 01/17/18 Right Antecubital-Site Assessment: Clean, dry, & intact     WOUND (if present)   Wound Type:  none   Dressing present     Wound Concerns/Notes:  none     PAIN    Pain Assessment    Pain Intensity 1: 0 (01/18/18 0819)              Patient Stated Pain Goal: 0  o Interventions for Pain:  None given  o Intervention effective: no c/o pain  o Time of last intervention: n/a   o Reassessment Completed: yes      Last 3 Weights:  Last 3 Recorded Weights in this Encounter    01/17/18 1505 01/18/18 0704   Weight: 85.3 kg (188 lb) 85.6 kg (188 lb 11.4 oz)     Weight change:      INTAKE/OUPUT    Current Shift:      Last three shifts: 01/16 1901 - 01/18 0700  In: -   Out: 300 [Urine:300]     LAB RESULTS     Recent Labs      01/18/18   0259  01/17/18   1515   WBC  8.5  10.2   HGB  14.6  14.3   HCT  43.9  44.9   PLT  233  225        Recent Labs      01/18/18   0259  01/17/18   1515   NA  139  138   K  4.0  4.3   GLU  102*  95   BUN  18  19*   CREA  1.36*  1.52*   CA  8.7  9.5   INR  1.1  1.0       RECOMMENDATIONS AND DISCHARGE PLANNING     1. Pending tests/procedures/ Plan of Care or Other Needs: ptt every 6 hours     2. Discharge plan for patient and Needs/Barriers: home    3. Estimated Discharge Date: tbd Posted on Whiteboard in [facility]: yes      4. The patient's care plan was reviewed with the oncoming nurse. \"HEALS\" SAFETY CHECK      Fall Risk    Total Score: 1    Safety Measures:      A safety check occurred in the patient's room between off going nurse and oncoming nurse listed above.     The safety check included the below items  Area Items   H  High Alert Medications - Verify all high alert medication drips (heparin, PCA, etc.)   E  Equipment - Suction is set up for ALL patients (with blancoker)  - Red plugs utilized for all equipment (IV pumps, etc.)  - WOWs wiped down at end of shift.  - Room stocked with oxygen, suction, and other unit-specific supplies A  Alarms - Bed alarm is set for fall risk patients  - Ensure chair alarm is in place and activated if patient is up in a chair   L  Lines - Check IV for any infiltration  - Paniagua bag is empty if patient has a Paniagua   - Tubing and IV bags are labeled   S  Safety   - Room is clean, patient is clean, and equipment is clean. - Hallways are clear from equipment besides carts. - Fall bracelet on for fall risk patients  - Ensure room is clear and free of clutter  - Suction is set up for ALL patients (with yanker)  - Hallways are clear from equipment besides carts.    - Isolation precautions followed, supplies available outside room, sign posted     Traci Mccoy RN

## 2018-01-18 NOTE — PROGRESS NOTES
Care Management Interventions  PCP Verified by CM: Yes (DR. ROPER)  Palliative Care Criteria Met (RRAT>21 & CHF Dx)?: No  Mode of Transport at Discharge: Other (see comment) (PT'S FAMILY WILL TRANSPORT THIS PT HOME)  Transition of Care Consult (CM Consult): Discharge Planning  Current Support Network: Lives with Spouse, Own Home, Family Lives Nearby  Confirm Follow Up Transport: Self  Plan discussed with Pt/Family/Caregiver: Yes (PT WORKS IN [employer] WITH A PLAN TO RETURN A WEEK OR SO AFTER THIS DISCHARGE . THIS PT IS  A [employer], BUT FLIES HOME ON A REGULARLY. )  Freedom of Choice Offered:  (NO  SERVICES REQUESTED)  [facility] Provided?: Yes (RETIRED AFTER 45 YEARS IN THE [employer])  Discharge Location  Discharge Placement: Home with family assistance  Pt lives at home with his wife, who travels with this to [facility] to the [place] Due to this pt's present employer. Pt's plan is to follow-up with PCP and  Follow-up with all recommendations and return to [facility] when ready for his discharge.

## 2018-01-18 NOTE — INTERDISCIPLINARY ROUNDS
Interdisciplinary Round Note   Patient Information:   Loyd Espino   [birth year]   Reason for Admission: Bilateral pulmonary embolism ([address]  Leg DVT (deep venous thromboembolism), acute, left (HCC)  Dyspnea  Pulmonary embolism (HCC)  Troponin I above reference range  Pneumonia   Attending Provider:   Yamil Santos MD  Primary Care Physician:       Elias White MD       [phone]   Estimated discharge date:  TBD   Hospital day: 1  [unfilled]  - - -  RRAT Score: Low Risk            8       Total Score        3 Has Seen PCP in Last 6 Months (Yes=3, No=0)    5 Pt. Coverage (Medicare=5 , Medicaid, or Self-Pay=4)        Criteria that do not apply:    . Living with Significant Other. Assisted Living. LTAC. SNF.  or   Rehab    Patient Length of Stay (>5 days = 3)    IP Visits Last 12 Months (1-3=4, 4=9, >4=11)    Charlson Comorbidity Score (Age + Comorbid Conditions)       normal sinus rhythm     No         Chemical      Lines, Drains, & Airways  Peripheral IV line       IV Antibiotics:    Current Antimicrobial Therapy (168h ago through future)    Ordered     Start Stop    01/17/18 2258  levoFLOXacin (LEVAQUIN) 750 mg in D5W IVPB  750 mg,   IntraVENous,   EVERY 24 HOURS      01/17/18 2300 --        GI Prophylaxis: GI Prophylaxis: no   Type:       Recent Glucose Results:   Lab Results   Component Value Date/Time     (H) 01/18/2018 02:59 AM    GLU 95 01/17/2018 03:15 PM      Activity Level:       Needs assistance with ADLs: no       Goals for Today:    Recommendations:   Discharge Disposition: Home Independent  CM  Follow up phone call to assess:   follow up physician appointments    Needs for Discharge:  IDR Team:   Recommendations from IDR team:     Other Notes:

## 2018-01-19 VITALS
RESPIRATION RATE: 18 BRPM | HEIGHT: 71 IN | OXYGEN SATURATION: 95 % | HEART RATE: 79 BPM | WEIGHT: 188.71 LBS | BODY MASS INDEX: 26.42 KG/M2 | TEMPERATURE: 97.9 F | DIASTOLIC BLOOD PRESSURE: 80 MMHG | SYSTOLIC BLOOD PRESSURE: 135 MMHG

## 2018-01-19 LAB
ANION GAP SERPL CALC-SCNC: 9 MMOL/L (ref 3–18)
APTT PPP: 49.8 SEC (ref 23–36.4)
ATRIAL RATE: 85 BPM
BASOPHILS # BLD: 0 K/UL (ref 0–0.1)
BASOPHILS NFR BLD: 0 % (ref 0–2)
BUN SERPL-MCNC: 14 MG/DL (ref 7–18)
BUN/CREAT SERPL: 10 (ref 12–20)
CALCIUM SERPL-MCNC: 9.2 MG/DL (ref 8.5–10.1)
CALCULATED P AXIS, ECG09: 48 DEGREES
CALCULATED R AXIS, ECG10: -15 DEGREES
CALCULATED T AXIS, ECG11: 31 DEGREES
CHLORIDE SERPL-SCNC: 100 MMOL/L (ref 100–108)
CO2 SERPL-SCNC: 29 MMOL/L (ref 21–32)
CREAT SERPL-MCNC: 1.36 MG/DL (ref 0.6–1.3)
DIAGNOSIS, 93000: NORMAL
DIFFERENTIAL METHOD BLD: ABNORMAL
EOSINOPHIL # BLD: 0.2 K/UL (ref 0–0.4)
EOSINOPHIL NFR BLD: 3 % (ref 0–5)
ERYTHROCYTE [DISTWIDTH] IN BLOOD BY AUTOMATED COUNT: 13.3 % (ref 11.6–14.5)
GLUCOSE SERPL-MCNC: 109 MG/DL (ref 74–99)
HCT VFR BLD AUTO: 42.1 % (ref 36–48)
HGB BLD-MCNC: 13.7 G/DL (ref 13–16)
LYMPHOCYTES # BLD: 1.9 K/UL (ref 0.9–3.6)
LYMPHOCYTES NFR BLD: 29 % (ref 21–52)
MCH RBC QN AUTO: 31.9 PG (ref 24–34)
MCHC RBC AUTO-ENTMCNC: 32.5 G/DL (ref 31–37)
MCV RBC AUTO: 98.1 FL (ref 74–97)
MONOCYTES # BLD: 0.8 K/UL (ref 0.05–1.2)
MONOCYTES NFR BLD: 13 % (ref 3–10)
NEUTS SEG # BLD: 3.6 K/UL (ref 1.8–8)
NEUTS SEG NFR BLD: 55 % (ref 40–73)
P-R INTERVAL, ECG05: 184 MS
PLATELET # BLD AUTO: 260 K/UL (ref 135–420)
PMV BLD AUTO: 9.8 FL (ref 9.2–11.8)
POTASSIUM SERPL-SCNC: 4.3 MMOL/L (ref 3.5–5.5)
Q-T INTERVAL, ECG07: 374 MS
QRS DURATION, ECG06: 96 MS
QTC CALCULATION (BEZET), ECG08: 445 MS
RBC # BLD AUTO: 4.29 M/UL (ref 4.7–5.5)
SODIUM SERPL-SCNC: 138 MMOL/L (ref 136–145)
THROMBIN TIME: >60 SECS (ref 13.8–18.2)
VENTRICULAR RATE, ECG03: 85 BPM
WBC # BLD AUTO: 6.6 K/UL (ref 4.6–13.2)

## 2018-01-19 PROCEDURE — 74011250637 HC RX REV CODE- 250/637: Performed by: NURSE PRACTITIONER

## 2018-01-19 PROCEDURE — 36415 COLL VENOUS BLD VENIPUNCTURE: CPT | Performed by: FAMILY MEDICINE

## 2018-01-19 PROCEDURE — 74011250636 HC RX REV CODE- 250/636: Performed by: EMERGENCY MEDICINE

## 2018-01-19 PROCEDURE — 74011250637 HC RX REV CODE- 250/637: Performed by: HOSPITALIST

## 2018-01-19 PROCEDURE — 85025 COMPLETE CBC W/AUTO DIFF WBC: CPT | Performed by: FAMILY MEDICINE

## 2018-01-19 PROCEDURE — 85730 THROMBOPLASTIN TIME PARTIAL: CPT | Performed by: FAMILY MEDICINE

## 2018-01-19 PROCEDURE — 74011000250 HC RX REV CODE- 250: Performed by: INTERNAL MEDICINE

## 2018-01-19 PROCEDURE — 94640 AIRWAY INHALATION TREATMENT: CPT

## 2018-01-19 PROCEDURE — 74011250636 HC RX REV CODE- 250/636

## 2018-01-19 PROCEDURE — 80048 BASIC METABOLIC PNL TOTAL CA: CPT | Performed by: FAMILY MEDICINE

## 2018-01-19 RX ORDER — HEPARIN SODIUM 1000 [USP'U]/ML
80 INJECTION, SOLUTION INTRAVENOUS; SUBCUTANEOUS ONCE
Status: DISCONTINUED | OUTPATIENT
Start: 2018-01-19 | End: 2018-01-19 | Stop reason: ALTCHOICE

## 2018-01-19 RX ORDER — FAMOTIDINE 20 MG/1
20 TABLET, FILM COATED ORAL 2 TIMES DAILY
Qty: 60 TAB | Refills: 0 | Status: SHIPPED
Start: 2018-01-19

## 2018-01-19 RX ORDER — LEVOFLOXACIN 750 MG/1
750 TABLET ORAL EVERY 24 HOURS
Status: DISCONTINUED | OUTPATIENT
Start: 2018-01-19 | End: 2018-01-19 | Stop reason: HOSPADM

## 2018-01-19 RX ORDER — HEPARIN SODIUM 1000 [USP'U]/ML
INJECTION, SOLUTION INTRAVENOUS; SUBCUTANEOUS
Status: COMPLETED
Start: 2018-01-19 | End: 2018-01-19

## 2018-01-19 RX ADMIN — Medication 10 ML: at 05:29

## 2018-01-19 RX ADMIN — LEVOTHYROXINE SODIUM 75 MCG: 75 TABLET ORAL at 08:35

## 2018-01-19 RX ADMIN — RIVAROXABAN 15 MG: 15 TABLET, FILM COATED ORAL at 11:23

## 2018-01-19 RX ADMIN — ALBUTEROL SULFATE 2.5 MG: 2.5 SOLUTION RESPIRATORY (INHALATION) at 08:36

## 2018-01-19 RX ADMIN — ALBUTEROL SULFATE 2.5 MG: 2.5 SOLUTION RESPIRATORY (INHALATION) at 11:23

## 2018-01-19 RX ADMIN — HEPARIN SODIUM AND DEXTROSE 15 UNITS/KG/HR: 10000; 5 INJECTION INTRAVENOUS at 05:02

## 2018-01-19 RX ADMIN — ALBUTEROL SULFATE 2.5 MG: 2.5 SOLUTION RESPIRATORY (INHALATION) at 05:11

## 2018-01-19 RX ADMIN — HEPARIN SODIUM 6800 UNITS: 1000 INJECTION, SOLUTION INTRAVENOUS; SUBCUTANEOUS at 08:07

## 2018-01-19 RX ADMIN — FAMOTIDINE 20 MG: 20 TABLET, FILM COATED ORAL at 08:36

## 2018-01-19 RX ADMIN — OLMESARTAN MEDOXOMIL 10 MG: 5 TABLET, COATED ORAL at 08:35

## 2018-01-19 NOTE — PROGRESS NOTES
Problem: Falls - Risk of  Goal: *Absence of Falls  Document Nicky Fall Risk and appropriate interventions in the flowsheet.    Outcome: Progressing Towards Goal  Fall Risk Interventions:  Mobility Interventions: Patient to call before getting OOB         Medication Interventions: Patient to call before getting OOB

## 2018-01-19 NOTE — ROUTINE PROCESS
Bedside and Verbal shift change report given to Sandi Bailey RN (oncoming nurse) by Nehemias Gomez RN   (offgoing nurse). Report included the following information SBAR, Kardex, Intake/Output, MAR and Recent Results.

## 2018-01-19 NOTE — PROGRESS NOTES
Pulmonary Progress    History  82 y/o male that presented to the ER on 1/17 with a 3 day history of leg pain and dyspnea. Chest CT showed significant clot burden in both pulmonary arteries, but no saddle embolus. Left LE doppler showed extensive acute and occlusive clot in the common femoral, femoral and deep femoral veins. More distal LLE veins showed multiple areas of non-occlusive thrombus. Hemodynamically he was never hypotensive, persistently tachycardic, or saturating less than 92% on RA while in the ER. There is no history of clotting disorders. Exam  Alert and oriented. Normal affect. Started on Xarelto; heparin stopped    Blood pressure 135/80, pulse 79, temperature 97.9 °F (36.6 °C), resp. rate 18, height 5' 10.5\" (1.791 m), weight 85.6 kg (188 lb 11.4 oz), SpO2 95 %. Pitting LLE edema. No gross neuro deficit    Labs: WBC 6.6   Cr 1.36   Troponin 0.09 highest value    Hypercoagulable panel pending    I personally reviewed and interpreted the patient's chest CT from 1/17/18. There are the above described PEs. There are wedge and rounded shaped peripheral infiltrates c/w pulmonary infarctions from PE. No significant mediastinal LAD. No pleural effusions    Assessment  Extensive venous thromboembolic disease  Subendocardial ischemia secondary to above  Mild renal insufficiency, old    Plan  Okay to d/c home  Repeat chest CT and echocardiogram in 2 weeks and 3 months with follow-up with me in clinic    Concerns for chronic anti-coagulation and anticipated follow-up discussed at length.

## 2018-01-19 NOTE — PROGRESS NOTES
01/18/18 2108   Patient Observation   Observations pt resting respiratory tech came in ;call bell withi nreach Instructed pt to call for assist getting up;pt voices understanding

## 2018-01-19 NOTE — PROGRESS NOTES
Patient discharged home today. IV and tele box removed. Patient was on a heparin drip and changed to Xarelto. Discharge instructions reviewed with patient and orders given for CT of chest and 2 D echo. Patient acceptant of education on DVT prevention and treatment. Patient left with no issues or complaints at time of discharge.

## 2018-01-19 NOTE — DISCHARGE INSTRUCTIONS
Discharge Instructions    Patient: Karina Bradshaw MRN: [id number]  CSN: [id number]    YOB: 1945  Age: 81 y.o. Sex: male    DOA: 1/17/2018 LOS:  LOS: 2 days   Discharge Date:      DIET:  Cardiac Diet    ACTIVITY: Activity as tolerated    ADDITIONAL INFORMATION: If you experience any of the following symptoms but not limited to Fever, chills, nausea, vomiting, diarrhea, change in mentation, falling, bleeding, shortness of breath, chest pain, please call your primary care physician or return to the emergency room if you cannot get hold of your doctor:     FOLLOW UP CARE:  PCP in 1 week  Pulmonary in 10-14 days    Billy Kaur NP  1/19/2018 12:20 PM               Deep Vein Thrombosis: Care Instructions  Your Care Instructions    A deep vein thrombosis (DVT) is a blood clot in certain veins of the legs, pelvis, or arms. Blood clots in these veins need to be treated because they can get bigger, break loose, and travel through the bloodstream to the lungs. A blood clot in a lung can be life-threatening. The doctor may have given you a blood thinner (anticoagulant). A blood thinner can stop the blood clot from growing larger and prevent new clots from forming. You will need to take a blood thinner for 3 to 6 months or longer. The doctor has checked you carefully, but problems can develop later. If you notice any problems or new symptoms, get medical treatment right away. Follow-up care is a key part of your treatment and safety. Be sure to make and go to all appointments, and call your doctor if you are having problems. It's also a good idea to know your test results and keep a list of the medicines you take. How can you care for yourself at home? · Take your medicines exactly as prescribed. Call your doctor if you think you are having a problem with your medicine. · If you are taking a blood thinner, be sure you get instructions about how to take your medicine safely.  Blood thinners can cause serious bleeding problems. · Wear compression stockings if your doctor recommends them. These stockings are tighter at the feet than on the legs. They may reduce pain and swelling in your legs. But there are different types of stockings, and they need to fit right. So your doctor will recommend what you need. · When you sit, use a pillow to raise the arm or leg that has the blood clot. Try to keep it above the level of your heart. When should you call for help? Call 911 anytime you think you may need emergency care. For example, call if:  ? · You passed out (lost consciousness). ? · You have symptoms of a blood clot in your lung (called a pulmonary embolism). These include:  ¨ Sudden chest pain. ¨ Trouble breathing. ¨ Coughing up blood. ?Call your doctor now or seek immediate medical care if:  ? · You have new or worse trouble breathing. ? · You are dizzy or lightheaded, or you feel like you may faint. ? · You have symptoms of a blood clot in your arm or leg. These may include:  ¨ Pain in the arm, calf, back of the knee, thigh, or groin. ¨ Redness and swelling in the arm, leg, or groin. ? Watch closely for changes in your health, and be sure to contact your doctor if:  ? · You do not get better as expected. Where can you learn more? Go to http://wyatt-clara.info/. Enter E783 in the search box to learn more about \"Deep Vein Thrombosis: Care Instructions. \"  Current as of: March 20, 2017  Content Version: 11.4  © 2946-3581 [employer]. Care instructions adapted under license by [employer] (which disclaims liability or warranty for this information). If you have questions about a medical condition or this instruction, always ask your healthcare professional. [place] [address] any warranty or liability for your use of this information.          Pulmonary Embolism: Care Instructions  Your Care Instructions    Pulmonary embolism is the sudden blockage of an artery in the lung. Blood clots in the deep veins of the leg or pelvis (deep vein thrombosis, or DVT) are the most common cause. These blood clots can travel to the lungs. Pulmonary embolism can be very serious. Because you have had one pulmonary embolism, you are at greater risk for having another one. But you can take steps to prevent another pulmonary embolism by following your doctor's instructions. You will probably take a prescription blood-thinning medicine to prevent blood clots. A blood thinner can stop a blood clot from growing larger and prevent new clots from forming. Follow-up care is a key part of your treatment and safety. Be sure to make and go to all appointments, and call your doctor if you are having problems. It's also a good idea to know your test results and keep a list of the medicines you take. How can you care for yourself at home? · Take your medicines exactly as prescribed. Call your doctor if you think you are having a problem with your medicine. You will get more details on the specific medicines your doctor prescribes. · If you are taking a blood thinner, be sure you get instructions about how to take your medicine safely. Blood thinners can cause serious bleeding problems. Preventing future pulmonary embolisms  · Exercise. Keep blood moving in your legs to keep clots from forming. If you are traveling by car, stop every hour or so. Get out and walk around for a few minutes. If you are traveling by bus, train, or plane, get out of your seat and walk up and down the aisles every hour or so. You also can do leg exercises while you are seated. Pump your feet up and down by pulling your toes up toward your knees then pointing them down. · Get up out of bed as soon as possible after an illness or surgery. · Do not smoke. If you need help quitting, talk to your doctor about stop-smoking programs and medicines.  These can increase your chances of quitting for good.  · Check with your doctor before taking hormone or birth control pills. These may increase your risk of blot clots. · Ask your doctor about wearing compression stockings to help prevent blood clots in your legs. There are different types of stockings, and they need to fit right. So your doctor will recommend what you need. When should you call for help? Call 911 anytime you think you may need emergency care. For example, call if:  ? · You have shortness of breath. ? · You have chest pain. ? · You passed out (lost consciousness). ? · You cough up blood. ?Call your doctor now or seek immediate medical care if:  ? · You have new or worsening pain or swelling in your leg. ? Watch closely for changes in your health, and be sure to contact your doctor if:  ? · You do not get better as expected. Where can you learn more? Go to http://wyatt-clara.info/. Enter Y373 in the search box to learn more about \"Pulmonary Embolism: Care Instructions. \"  Current as of: March 20, 2017  Content Version: 11.4  © 4534-5916 [employer]. Care instructions adapted under license by [employer] (which disclaims liability or warranty for this information). If you have questions about a medical condition or this instruction, always ask your healthcare professional. [address] any warranty or liability for your use of this information. Pneumonia: Care Instructions  Your Care Instructions    Pneumonia is an infection of the lungs. Most cases are caused by infections from bacteria or viruses. Pneumonia may be mild or very severe. If it is caused by bacteria, you will be treated with antibiotics. It may take a few weeks to a few months to recover fully from pneumonia, depending on how sick you were and whether your overall health is good. Follow-up care is a key part of your treatment and safety.  Be sure to make and go to all appointments, and call your doctor if you are having problems. It's also a good idea to know your test results and keep a list of the medicines you take. How can you care for yourself at home? · Take your antibiotics exactly as directed. Do not stop taking the medicine just because you are feeling better. You need to take the full course of antibiotics. · Take your medicines exactly as prescribed. Call your doctor if you think you are having a problem with your medicine. · Get plenty of rest and sleep. You may feel weak and tired for a while, but your energy level will improve with time. · To prevent dehydration, drink plenty of fluids, enough so that your urine is light yellow or clear like water. Choose water and other caffeine-free clear liquids until you feel better. If you have kidney, heart, or liver disease and have to limit fluids, talk with your doctor before you increase the amount of fluids you drink. · Take care of your cough so you can rest. A cough that brings up mucus from your lungs is common with pneumonia. It is one way your body gets rid of the infection. But if coughing keeps you from resting or causes severe fatigue and chest-wall pain, talk to your doctor. He or she may suggest that you take a medicine to reduce the cough. · Use a vaporizer or humidifier to add moisture to your bedroom. Follow the directions for cleaning the machine. · Do not smoke or allow others to smoke around you. Smoke will make your cough last longer. If you need help quitting, talk to your doctor about stop-smoking programs and medicines. These can increase your chances of quitting for good. · Take an over-the-counter pain medicine, such as acetaminophen (Tylenol), ibuprofen (Advil, Motrin), or naproxen (Aleve). Read and follow all instructions on the label. · Do not take two or more pain medicines at the same time unless the doctor told you to. Many pain medicines have acetaminophen, which is Tylenol.  Too much acetaminophen (Tylenol) can be harmful. · If you were given a spirometer to measure how well your lungs are working, use it as instructed. This can help your doctor tell how your recovery is going. · To prevent pneumonia in the future, talk to your doctor about getting a flu vaccine (once a year) and a pneumococcal vaccine (one time only for most people). When should you call for help? Call 911 anytime you think you may need emergency care. For example, call if:  ? · You have severe trouble breathing. ?Call your doctor now or seek immediate medical care if:  ? · You cough up dark brown or bloody mucus (sputum). ? · You have new or worse trouble breathing. ? · You are dizzy or lightheaded, or you feel like you may faint. ? Watch closely for changes in your health, and be sure to contact your doctor if:  ? · You have a new or higher fever. ? · You are coughing more deeply or more often. ? · You are not getting better after 2 days (48 hours). ? · You do not get better as expected. Where can you learn more? Go to http://wyatt-clara.info/. Enter [id number] in the search box to learn more about \"Pneumonia: Care Instructions. \"  Current as of: May 12, 2017  Content Version: 11.4  © 9081-7580 [employer]. Care instructions adapted under license by [employer] (which disclaims liability or warranty for this information). If you have questions about a medical condition or this instruction, always ask your healthcare professional. [place] [address] any warranty or liability for your use of this information.       Patient armband removed and shredded      DISCHARGE SUMMARY from Nurse    PATIENT INSTRUCTIONS:    After general anesthesia or intravenous sedation, for 24 hours or while taking prescription Narcotics:  · Limit your activities  · Do not drive and operate hazardous machinery  · Do not make important personal or business decisions  · Do  not drink alcoholic beverages  · If you have not urinated within 8 hours after discharge, please contact your surgeon on call. Report the following to your surgeon:  · Excessive pain, swelling, redness or odor of or around the surgical area  · Temperature over 100.5  · Nausea and vomiting lasting longer than 4 hours or if unable to take medications  · Any signs of decreased circulation or nerve impairment to extremity: change in color, persistent  numbness, tingling, coldness or increase pain  · Any questions    What to do at Home:  Recommended activity: Activity as tolerated    If you experience any of the following symptoms Nausea, vomiting, diarrhea, fever greater than 100.5, dizziness, severe headache, shortness of breath, chest pain, increased pain, please follow up with PCP. *  Please give a list of your current medications to your Primary Care Provider. *  Please update this list whenever your medications are discontinued, doses are      changed, or new medications (including over-the-counter products) are added. *  Please carry medication information at all times in case of emergency situations. These are general instructions for a healthy lifestyle:    No smoking/ No tobacco products/ Avoid exposure to second hand smoke  Surgeon General's Warning:  Quitting smoking now greatly reduces serious risk to your health. Obesity, smoking, and sedentary lifestyle greatly increases your risk for illness    A healthy diet, regular physical exercise & weight monitoring are important for maintaining a healthy lifestyle    You may be retaining fluid if you have a history of heart failure or if you experience any of the following symptoms:  Weight gain of 3 pounds or more overnight or 5 pounds in a week, increased swelling in our hands or feet or shortness of breath while lying flat in bed. Please call your doctor as soon as you notice any of these symptoms; do not wait until your next office visit.     Recognize signs and symptoms of STROKE:    F-face looks uneven    A-arms unable to move or move unevenly    S-speech slurred or non-existent    T-time-call 911 as soon as signs and symptoms begin-DO NOT go       Back to bed or wait to see if you get better-TIME IS BRAIN. Warning Signs of HEART ATTACK     Call 911 if you have these symptoms:   Chest discomfort. Most heart attacks involve discomfort in the center of the chest that lasts more than a few minutes, or that goes away and comes back. It can feel like uncomfortable pressure, squeezing, fullness, or pain.  Discomfort in other areas of the upper body. Symptoms can include pain or discomfort in one or both arms, the back, neck, jaw, or stomach.  Shortness of breath with or without chest discomfort.  Other signs may include breaking out in a cold sweat, nausea, or lightheadedness. Don't wait more than five minutes to call 911 - MINUTES MATTER! Fast action can save your life. Calling 911 is almost always the fastest way to get lifesaving treatment. Emergency Medical Services staff can begin treatment when they arrive -- up to an hour sooner than if someone gets to the hospital by car. The discharge information has been reviewed with the patient. The patient verbalized understanding. Discharge medications reviewed with the patient and appropriate educational materials and side effects teaching were provided.   ___________________________________________________________________________________________________________________________________

## 2018-01-20 LAB
PROT C PPP-ACNC: 124 % (ref 73–180)
PROT S ACT/NOR PPP: 123 % (ref 57–157)
PROT S PPP-ACNC: 82 % (ref 60–150)

## 2018-01-20 NOTE — DISCHARGE SUMMARY
[facility]  Discharge Summary       Patient: Roselinda Bamberger. Age: 81 y.o. : 1945 MR#: [id number] SSN: [id number]  PCP on record: Chino Wagner MD  Admit date: 2018  Discharge date: 2018    Disposition:   []Home   []Home with Home Health   []SNF/NH   []Rehab   [x]Home with family   []Alternate Facility:____________________    Discharge Diagnoses:                             1. Bilateral Pulmonary emboli / Left leg DVT  2. Mild elevation in troponin  3. R/o pneumonia  4. Hypertension   5. H/o recent UTI and kidney stones  6. GERD    Discharge Medications:     Discharge Medication List as of 2018  1:46 PM      START taking these medications    Details   famotidine (PEPCID) 20 mg tablet Take 1 Tab by mouth two (2) times a day., No Print, Disp-60 Tab, R-0      rivaroxaban (XARELTO STARTER EDITH) 15 mg (42)- 20 mg (9) DsPk Take one 15 mg tablet twice a day with food for the first 21 days. Then, take one 20 mg tablet once a day with food for 9 days. , Normal, Disp-1 Dose Pack, R-0         CONTINUE these medications which have NOT CHANGED    Details   levothyroxine (SYNTHROID) 75 mcg tablet Take  by mouth Daily (before breakfast). , Historical Med      ATORVASTATIN CALCIUM (LIPITOR PO) Take 10 mg by mouth. Historical Med, 10 mg      OLMESARTAN MEDOXOMIL (BENICAR PO) Take 20 mg by mouth daily. 1/2 tab daily  , Historical Med      DIAZEPAM (VALIUM PO) Take  by mouth. Historical Med      valacyclovir (VALTREX) 500 mg tablet Take  by mouth two (2) times a day. Historical Med      MULTIVITAMIN PO Take  by mouth. Historical Med         STOP taking these medications       ASPIRIN PO Comments:   Reason for Stopping:             Consults:    - Pulmonary    Procedures:  -     Significant Diagnostic Studies:   CT CHEST W CONT on 2018:  -  IMPRESSION:      1.  Moderate to large burden of acute pulmonary emboli in bilateral lower lobes  with complete to near complete occlusion.  -Patient was informed and taken to ER registration by the technologist. Also  discussed with the ER physician Dr. Edvin Leiva. 2. Lingular consolidative opacities with air bronchogram likely pulmonary  infarct versus infection. 3. Plaque-like pleural thickening or loculated fluid in the lateral right  midlung measuring 3.6 x 1.2 cm (Hounsfield unit 17), probably loculated fluid. 4. Trace left pleural effusion. DUPLEX LOWER EXT VENOUS LEFT on 01/19/2018  Left leg :  1. Acute occlusive deep venous thrombosis identified in the common  femoral, femoral and  deep femoral veins. 2. Acute non-occlusive  deep venous thrombosis identified in the  popliteal vein. 3. Deep veins visualized include the common femoral, femoral, deep  femoral, popliteal, posterior tibial and peroneal veins. 4. Acute non-occlusive superficial venous thrombosis identified in the   great saphenous vein at the sapheno-femoral junction. No evidence of deep vein thrombosis in the contralateral common  femoral vein. 5. Acute non-occlusive superficial venous thrombosis identified in the   great saphenous vein at the sapheno-femoral junction. 6. Superficial vein visualized include the great saphenous vein. Transthoracic Echocardiogram on 01/18/2018  SUMMARY:  Left ventricle: Size was normal. Systolic function was normal by visual   assessment. Ejection fraction was estimated in  the range of 60 % to 65 %. No obvious wall motion abnormalities identified in   the views obtained. Wall thickness was at  the upper limits of normal.    Right ventricle: Systolic pressure was moderately increased. Estimated peak   pressure was 50 mmHg. COMPARISONS:  Comparison was made with the previous study of 31-Jan-2014. Pulmonary artery   pressure has increased from 25-30 mmHg. Hospital Course by Problem   1.  Bilateral Pulmonary emboli / Left leg DVT - at time of admission patient found to have moderate to large burden of pulmonary emboli with near complete occlusion as well as left leg DVT. Despite this only symptoms were shortness of breath with extensive ambulation (pt states 500-600 feet) and mild left greater than right pleuritic type chest pain. Initially managed with heparin drip and pulmonology was consulted. Reviewed with patient and wife risk/benefits of oral anticoagulation including monitoring for bleeding episodes and risk of brain bleed / death with head trauma, patient agreeable to start. Follow up with pulmonary in 2-3 weeks and chest CT / TTE in 2 weeks (prior to pulm appointment). 2. Mild elevation in troponin - Pleuritic chest pain resolved on day of discharge and no increased chest pain with ambulation. Troponin trending down, mild elevation likely related to heart strain from clot burden. 3. R/o pneumonia - initially placed on levaquin for suspected pneumonia but patient has remained free of shortness of breath, fevers and no leukocytosis. No antibiotics provided on discharge. 4. Hypertension - has remained controlled during admission. Continue benicar on discharge. 5. H/o recent UTI and kidney stones - no acute issues denies dysuria or flank pain and recently completed treatment. 6. GERD - Patient c/o reflux type discomfort for which pepcid was resumed with positive effect. Resume on discharge. Today's examination of the patient revealed:     Subjective:   Feels well, no shortness of breath, pain to left>right side of chest resolved completely.   No n/v/constipation  Objective:   VS:   Visit Vitals    /80 (BP 1 Location: Left arm, BP Patient Position: Head of bed elevated (Comment degrees))    Pulse 79    Temp 97.9 °F (36.6 °C)    Resp 18    Ht 5' 10.5\" (1.791 m)    Wt 85.6 kg (188 lb 11.4 oz)    SpO2 95%    BMI 26.69 kg/m2      Tmax/24hrs: Temp (24hrs), Av.9 °F (36.6 °C), Min:97.9 °F (36.6 °C), Max:97.9 °F (36.6 °C)     Input/Output:   Intake/Output Summary (Last 24 hours) at 18 [id number]  Last data filed at 01/19/18 0839   Gross per 24 hour   Intake            253.6 ml   Output              300 ml   Net            -46.4 ml     General:  Alert, NAD  Cardiovascular:  RRR  Pulmonary:  LSC throughout; respiratory effort WNL at rest and with ambulation  GI:  +BS in all four quadrants, soft, non-tender  Extremities:  No edema; 2+ dorsalis pedis pulses bilaterally  Neuro: oriented x 4    Labs:    No results found for this or any previous visit (from the past 24 hour(s)).   Additional Data Reviewed:     Condition:   Follow-up Appointments:   PCP in 1 week Pulmonary Dr. Conda Hashimoto in 2-3 weeks     >30 minutes spent coordinating this discharge (review instructions/follow-up, prescriptions, preparing report for sign off)    Signed:  Srikanth Bernard NP  1/20/2018  5:49 AM

## 2018-01-21 LAB
HEXAGONAL PHASE PHOSPHOLIPID, 117839: 6 SEC (ref 0–11)
PTT-LA MIX, LUPR1T: 65 SEC (ref 0–48.9)

## 2018-01-22 LAB
INTERPRETATION, 117893: ABNORMAL
SCREEN APTT: 71.2 SEC (ref 0–51.9)
SCREEN DRVVT: 39.9 SEC (ref 0–47)

## 2018-01-23 LAB
CARDIOLIPIN IGA SER IA-ACNC: <9 APL U/ML (ref 0–11)
CARDIOLIPIN IGG SER IA-ACNC: <9 GPL U/ML (ref 0–14)
CARDIOLIPIN IGM SER IA-ACNC: <9 MPL U/ML (ref 0–12)
COMMENT, 511217: ABNORMAL
F5 GENE MUT ANL BLD/T: ABNORMAL

## 2018-02-01 ENCOUNTER — HOSPITAL ENCOUNTER (OUTPATIENT)
Dept: CT IMAGING | Age: 73
Discharge: HOME | End: 2018-02-01
Attending: INTERNAL MEDICINE
Payer: MEDICARE

## 2018-02-01 DIAGNOSIS — R06.02 SHORTNESS OF BREATH: ICD-10-CM

## 2018-02-01 PROCEDURE — 71275 CT ANGIOGRAPHY CHEST: CPT

## 2018-02-01 PROCEDURE — 74011636320 HC RX REV CODE- 636/320: Performed by: INTERNAL MEDICINE

## 2018-02-01 RX ADMIN — IOPAMIDOL 80 ML: 612 INJECTION, SOLUTION INTRAVENOUS at 12:00

## 2018-02-06 ENCOUNTER — OFFICE VISIT (OUTPATIENT)
Dept: PULMONOLOGY | Facility: CLINIC | Age: 73
End: 2018-02-06

## 2018-02-06 VITALS
BODY MASS INDEX: 26.32 KG/M2 | DIASTOLIC BLOOD PRESSURE: 96 MMHG | TEMPERATURE: 98.4 F | RESPIRATION RATE: 18 BRPM | HEIGHT: 71 IN | HEART RATE: 78 BPM | OXYGEN SATURATION: 97 % | SYSTOLIC BLOOD PRESSURE: 140 MMHG | WEIGHT: 188 LBS

## 2018-02-06 DIAGNOSIS — I26.99 OTHER ACUTE PULMONARY EMBOLISM WITHOUT ACUTE COR PULMONALE (HCC): ICD-10-CM

## 2018-02-06 DIAGNOSIS — I82.412 ACUTE DEEP VEIN THROMBOSIS (DVT) OF FEMORAL VEIN OF LEFT LOWER EXTREMITY (HCC): Primary | ICD-10-CM

## 2018-02-06 RX ORDER — LEVOFLOXACIN 500 MG/1
TABLET, FILM COATED ORAL DAILY
COMMUNITY
End: 2018-05-07 | Stop reason: ALTCHOICE

## 2018-02-06 NOTE — PROGRESS NOTES
[facility]  Pulmonary, Critical Care, and Sleep Medicine      Pulmonary Office Progress Notes    Name: Kishan Barnes. : 1945     Date: 2018        Subjective:     Patient is a 81 y.o. male here for follow up for on DVT/PE with right heart strain. He denies chest pain or shortness of breath. He has \"taken it easy\" but is not limited in exercise tolerance by leg pain. His left leg edema has decreased. He will be traveling back to [place] in about one month. He has been compliant with his medications. No unusual bruising or bleeding. No Known Allergies    Current Outpatient Prescriptions   Medication Sig Dispense Refill    levoFLOXacin (LEVAQUIN) 500 mg tablet Take  by mouth daily.  famotidine (PEPCID) 20 mg tablet Take 1 Tab by mouth two (2) times a day. 60 Tab 0    rivaroxaban (XARELTO STARTER EDITH) 15 mg (42)- 20 mg (9) DsPk Take one 15 mg tablet twice a day with food for the first 21 days. Then, take one 20 mg tablet once a day with food for 9 days. 1 Dose Pack 0    levothyroxine (SYNTHROID) 75 mcg tablet Take  by mouth Daily (before breakfast).  ATORVASTATIN CALCIUM (LIPITOR PO) Take 10 mg by mouth.  OLMESARTAN MEDOXOMIL (BENICAR PO) Take 20 mg by mouth daily. 1/2 tab daily        valacyclovir (VALTREX) 500 mg tablet Take  by mouth two (2) times a day.  MULTIVITAMIN PO Take  by mouth.  DIAZEPAM (VALIUM PO) Take  by mouth. Objective:     Visit Vitals    BP (!) 140/96 (BP 1 Location: Left arm, BP Patient Position: Sitting)    Pulse 78    Temp 98.4 °F (36.9 °C) (Oral)    Resp 18    Ht 5' 10.5\" (1.791 m)    Wt 85.3 kg (188 lb)    SpO2 97%    BMI 26.59 kg/m2        Physical Exam:   General: comfortable, no acute distress  HEENT: sclera anicteric, EOM intact  Neck: no JVD  RS: No accessory muscle use  Neuro: normal gait, awake, alert  Extrm: no leg edema, clubbing or cyanosis.   Left leg still slightly larger than the left  Skin: no rash over legs. Data review:   Cardiolipin AB negative  Factor V Leiden Positive  Lupus anticoagulant negative  Protein C and S normal   Hex phase phospholipid normal      Imaging:  I have personally reviewed the patients radiographs and have reviewed the reports:  Chest CT from 2/1/2018 compared to 1/17/2018 by my interpretation shows significant improvement in the left sided pulmonary arteries. Similarly, the RUL arteries show significant improvement. The arteries to the RLL continue to show extensive clot formation. Mitigating this finding is that the clot is actually appreciable. The earlier chest CT shows no contrast to this area at all. The peripheral infiltrate likely represents infarcted lung from proximal clot fracture and dispersal, and will not need any specific treatment. Levaquin can be stopped. Overall the clot burden is clearly much improved and shows no indication for lytics or other intervention. Echocardiogram:  In short, by report the right ventricle and its pressures have returned to normal.  There is no evidence of right sided failure. The right atrium remains normal.       Patient Active Problem List   Diagnosis Code    Calculus of kidney N20.0    Bilateral pulmonary embolism (HCC) I26.99    Leg DVT (deep venous thromboembolism), acute, left ([facility]) I82.402    Pneumonia J18.9       IMPRESSION:   · DVT LLE  · PE with right heart strain, improved  · Factor V Leiden positive  · Esophageal thickening on chest CT by report     We discussed the findings extensively. I see no contraindication to advancing his exercise activities. Walking at a normal pace is completely acceptable. I am optimistic that there will be no evidence of persistent disease at 3 months. Repeat echocardiogram is unnecessary. He may be a candidate for half-dose Xarelto long-term for clot prevention, particularly when traveling.   He may wear knee high compression stockings to decrease vein injury. RECOMMENDATIONS:   · Continue Xarelto  · 3 month return to clinic with chest CT with contrast  · No contraindication to travel or increasing exercise activities  · Consider GI referral after completion of 3 months of anticoagulation therapy to evaluate esophageal thickening noted on chest CT. · Stop Levaquin          Follow-up Disposition: Not on File     Mr. Sanjiv Retana has a reminder for a \"due or due soon\" health maintenance. I have asked that he contact his primary care provider for follow-up on this health maintenance. Zayda Santiago MD   Total office visit time 45 minutes.

## 2018-02-06 NOTE — MR AVS SNAPSHOT
[address] [place] [address] 400 [facility] [phone] [id number] 
[phone] Patient: Lisset Bradley. MRN: [id number] :1945 Visit Information Date & Time Provider Department Dept. Phone Encounter #  
 2018  1:00 PM Sharyl Mohs, MD [facility] [id number] Follow-up Instructions Return in about 3 months (around 2018), or if symptoms worsen or fail to improve, for chest CT. Follow-up and Disposition History Upcoming Health Maintenance Date Due Hepatitis C Screening 1945 DTaP/Tdap/Td series (1 - Tdap) 1968 FOBT Q 1 YEAR AGE 50-75 1997 ZOSTER VACCINE AGE 60> 2007 GLAUCOMA SCREENING Q2Y 2012 Pneumococcal 65+ Low/Medium Risk (1 of 2 - PCV13) 2012 MEDICARE YEARLY EXAM 2012 Influenza Age 5 to Adult 2017 Allergies as of 2018  Review Complete On: 2018 By: Sharyl Mohs, MD  
 No Known Allergies Current Immunizations  Never Reviewed No immunizations on file. Not reviewed this visit You Were Diagnosed With   
  
 Codes Comments Acute deep vein thrombosis (DVT) of femoral vein of left lower extremity (HCC)    -  Primary ICD-10-CM: H85.592 ICD-9-CM: 453.41 Other acute pulmonary embolism without acute cor pulmonale (HCC)     ICD-10-CM: I26.99 
ICD-9-CM: 415.19 Vitals BP Pulse Temp Resp Height(growth percentile) Weight(growth percentile) (!) 140/96 (BP 1 Location: Left arm, BP Patient Position: Sitting) 78 98.4 °F (36.9 °C) (Oral) 18 5' 10.5\" (1.791 m) 188 lb (85.3 kg) SpO2 BMI Smoking Status 97% 26.59 kg/m2 Former Smoker Vitals History BMI and BSA Data Body Mass Index Body Surface Area  
 26.59 kg/m 2 2.06 m 2 Preferred Pharmacy Pharmacy Name Phone [id number] - [address] [phone] Your Updated Medication List  
  
   
This list is accurate as of: 2/6/18 11:59 PM.  Always use your most recent med list.  
  
  
  
  
 Dalteofilo Cos Take 20 mg by mouth daily. 1/2 tab daily  
  
 famotidine 20 mg tablet Commonly known as:  PEPCID Take 1 Tab by mouth two (2) times a day. LEVAQUIN 500 mg tablet Generic drug:  levoFLOXacin Take  by mouth daily. LIPITOR PO Take 10 mg by mouth. MULTIVITAMIN PO Take  by mouth.  
  
 rivaroxaban 15 mg (42)- 20 mg (9) Dspk Commonly known as:  [employer] EDITH Take one 15 mg tablet twice a day with food for the first 21 days. Then, take one 20 mg tablet once a day with food for 9 days. SYNTHROID 75 mcg tablet Generic drug:  levothyroxine Take  by mouth Daily (before breakfast). VALIUM PO Take  by mouth. VALTREX 500 mg tablet Generic drug:  valACYclovir Take  by mouth two (2) times a day. Follow-up Instructions Return in about 3 months (around 5/6/2018), or if symptoms worsen or fail to improve, for chest CT. To-Do List   
 02/06/2018 Imaging:  CT CHEST W CONT Introducing [facility]! [facility] introduces Metranome patient portal. Now you can access parts of your medical record, email your doctor's office, and request medication refills online. 1. In your internet browser, go to [email or web address] 2. Click on the First Time User? Click Here link in the Sign In box. You will see the New Member Sign Up page. 3. Enter your Metranome Access Code exactly as it appears below. You will not need to use this code after youve completed the sign-up process. If you do not sign up before the expiration date, you must request a new code. · Metranome Access Code: [email or web address] Expires: 4/17/2018  9:42 AM 
 
4. Enter the last four digits of your Social Security Number (xxxx) and Date of Birth (mm/dd/yyyy) as indicated and click Submit.  You will be taken to the next sign-up page. 5. Create a [employer] ID. This will be your [employer] login ID and cannot be changed, so think of one that is secure and easy to remember. 6. Create a [employer] password. You can change your password at any time. 7. Enter your Password Reset Question and Answer. This can be used at a later time if you forget your password. 8. Enter your e-mail address. You will receive e-mail notification when new information is available in [address]. 9. Click Sign Up. You can now view and download portions of your medical record. 10. Click the Download Summary menu link to download a portable copy of your medical information. If you have questions, please visit the Frequently Asked Questions section of the [employer] website. Remember, [employer] is NOT to be used for urgent needs. For medical emergencies, dial 911. Now available from your iPhone and Android! Please provide this summary of care documentation to your next provider. Your primary care clinician is listed as [facility]. If you have any questions after today's visit, please call [phone].

## 2018-02-06 NOTE — PROGRESS NOTES
Chief Complaint   Patient presents with   [facility] Follow Up     patient is here today to follow up from his hospital stay. he is wanting to know if he is able to go back to work on thursday as he is concerned about flying. 1. Have you been to the ER, urgent care clinic since your last visit? Hospitalized since your last visit? No    2. Have you seen or consulted any other health care providers outside of the [address] [place] [address] since your last visit? Include any pap smears or colon screening.  No

## 2018-05-04 ENCOUNTER — HOSPITAL ENCOUNTER (OUTPATIENT)
Dept: CT IMAGING | Age: 73
Discharge: HOME | End: 2018-05-04
Attending: INTERNAL MEDICINE
Payer: MEDICARE

## 2018-05-04 DIAGNOSIS — I26.99 OTHER ACUTE PULMONARY EMBOLISM WITHOUT ACUTE COR PULMONALE (HCC): ICD-10-CM

## 2018-05-04 DIAGNOSIS — I82.412 ACUTE DEEP VEIN THROMBOSIS (DVT) OF FEMORAL VEIN OF LEFT LOWER EXTREMITY (HCC): ICD-10-CM

## 2018-05-04 LAB — CREAT UR-MCNC: 1.3 MG/DL (ref 0.6–1.3)

## 2018-05-04 PROCEDURE — 71275 CT ANGIOGRAPHY CHEST: CPT

## 2018-05-04 PROCEDURE — 74011636320 HC RX REV CODE- 636/320: Performed by: INTERNAL MEDICINE

## 2018-05-04 PROCEDURE — 82565 ASSAY OF CREATININE: CPT

## 2018-05-04 RX ADMIN — IOPAMIDOL 75 ML: 755 INJECTION, SOLUTION INTRAVENOUS at 11:00

## 2018-05-07 ENCOUNTER — OFFICE VISIT (OUTPATIENT)
Dept: PULMONOLOGY | Facility: CLINIC | Age: 73
End: 2018-05-07

## 2018-05-07 VITALS
DIASTOLIC BLOOD PRESSURE: 92 MMHG | RESPIRATION RATE: 18 BRPM | OXYGEN SATURATION: 100 % | HEART RATE: 88 BPM | SYSTOLIC BLOOD PRESSURE: 130 MMHG

## 2018-05-07 DIAGNOSIS — I26.99 PULMONARY EMBOLISM AND INFARCTION (HCC): Primary | ICD-10-CM

## 2018-05-07 RX ORDER — OMEPRAZOLE 20 MG/1
20 CAPSULE, DELAYED RELEASE ORAL DAILY
COMMUNITY

## 2018-05-07 NOTE — PATIENT INSTRUCTIONS
Rivaroxaban (By mouth)   Rivaroxaban (kbf-o-SPE-a-ban)  Treats and prevents blood clots, which lowers the risk of stroke, deep vein thrombosis (DVT), pulmonary embolism (PE), and similar conditions. This medicine is a blood thinner. Brand Name(s): Xarelto, Xarelto Starter Pack   There may be other brand names for this medicine. When This Medicine Should Not Be Used: This medicine is not right for everyone. Do not use it if you had an allergic reaction to rivaroxaban, or you have severe bleeding. How to Use This Medicine:   Tablet  · Take this medicine as directed, and take it at the same time each day. · 10-milligram (mg) tablet: Take with or without food. · 15-mg or 20-mg tablet: Take with food. · If you cannot swallow the tablets, you may crush the tablet and mix it with applesauce. Eat some food after you swallow the mixture. · Tube feeding: You may crush the tablet and mix the medicine in 50 milliliters (mL) of water before giving it via the tube. This must be followed by a feeding. · This medicine should come with a Medication Guide. Ask your pharmacist for a copy if you do not have one. · Missed dose:   ¨ Ask your doctor or pharmacist if you are not sure what to do if you miss a dose. ¨ Once-daily dose: If you miss a dose or forget to use your medicine, use it as soon as you can on the same day. Do not use extra medicine to make up for a missed dose. ¨ Twice-daily dose to treat a blood clot (15-mg tablet): If you miss a dose or forget to use your medicine, use it as soon as you can on the same day. You may take 2 doses at the same time to make up for the missed dose. This is only for people who take a total of 30 mg per day. · Store the medicine in a closed container at room temperature, away from heat, moisture, and direct light.   Drugs and Foods to Avoid:   Ask your doctor or pharmacist before using any other medicine, including over-the-counter medicines, vitamins, and herbal products. · Some foods and medicines can affect how rivaroxaban works. Tell your doctor if you are using any of the following:  ¨ NSAID medicine (including aspirin, celecoxib, diclofenac, ibuprofen, naproxen)  ¨ Ketoconazole, itraconazole, lopinavir, ritonavir, indinavir, conivaptan, carbamazepine, phenytoin, rifampin, Bryan's wort  ¨ Another blood thinner (including clopidogrel, enoxaparin, heparin, warfarin)  Warnings While Using This Medicine:   · Tell your doctor if you are pregnant or breastfeeding, or if you have kidney disease, liver disease, bleeding problems, or an artificial heart valve. · This medicine may increase your risk of bleeding. Be careful to avoid injuries that could cause bleeding. Stay away from rough sports or other situations where you could be bruised, cut, or hurt. Brush and floss your teeth gently. Be careful when using sharp objects, including razors and fingernail clippers. Avoid picking your nose. If you need to blow your nose, blow it gently. · This medicine may cause nerve damage if you have a medical procedure done to your back, including anesthesia or a spinal puncture. This is more likely to happen if you have a history of back injury, back surgery, problems with your spine, or procedures or punctures to your back. Tell your doctor if you are also taking another blood thinner, because this also increases the risk. · Do not stop using this medicine suddenly without asking your doctor. You might have a higher risk of stroke for a short time after you stop using this medicine. · Tell any doctor or dentist who treats you that you are using this medicine. · Your doctor will do lab tests at regular visits to check on the effects of this medicine. Keep all appointments. · Keep all medicine out of the reach of children. Never share your medicine with anyone.   Possible Side Effects While Using This Medicine:   Call your doctor right away if you notice any of these side effects:  · Allergic reaction: Itching or hives, swelling in your face or hands, swelling or tingling in your mouth or throat, chest tightness, trouble breathing  · Blistering, peeling, or red skin rash  · Decrease in how much or how often you urinate  · Heavy menstrual bleeding, or vaginal bleeding  · Red or brown urine, bloody or black, tarry stools  · Unusual bleeding or bruising, including frequent nosebleeds  · Vomiting blood or material that looks like coffee grounds  If you notice other side effects that you think are caused by this medicine, tell your doctor. Call your doctor for medical advice about side effects. You may report side effects to FDA at 7-180-JRR-0714  © 2017 [facility] Information is for End User's use only and may not be sold, redistributed or otherwise used for commercial purposes. The above information is an  only. It is not intended as medical advice for individual conditions or treatments. Talk to your doctor, nurse or pharmacist before following any medical regimen to see if it is safe and effective for you.

## 2018-05-07 NOTE — PROGRESS NOTES
Chief Complaint   Patient presents with    Follow-up     patient is here to follow to up from his last visit, states that he has been able to get back into the gym and is feeling much more like his usual self. denies any chest pain/dizz/headache/shortness of breath     1. Have you been to the ER, urgent care clinic since your last visit? Hospitalized since your last visit? No    2. Have you seen or consulted any other health care providers outside of the [employer] [facility] since your last visit? Include any pap smears or colon screening.  No

## 2018-05-07 NOTE — PROGRESS NOTES
[facility]  Pulmonary, Critical Care, and Sleep Medicine      Pulmonary Office Progress Notes    Name: Terri Alexander. : 1945     Date: 2018        Subjective:     Patient is a 81 y.o. male is here for follow up of pulmonary embolism. Since he was last here the patient has increased his exercise activities. He is now nearly back to baseline. His left lower extremity is no longer as swollen as it was previously. He denies any chest pains. He has had no unusual bruising or bleeding on Xarelto. He has flown to [facility] without any appreciable problems. Current Outpatient Prescriptions   Medication Sig Dispense Refill    omeprazole (PRILOSEC) 20 mg capsule Take 20 mg by mouth daily.  rivaroxaban (XARELTO STARTER EDITH) 15 mg (42)- 20 mg (9) DsPk Take one 15 mg tablet twice a day with food for the first 21 days. Then, take one 20 mg tablet once a day with food for 9 days. 1 Dose Pack 0    levothyroxine (SYNTHROID) 75 mcg tablet Take  by mouth Daily (before breakfast).  ATORVASTATIN CALCIUM (LIPITOR PO) Take 10 mg by mouth.  OLMESARTAN MEDOXOMIL (BENICAR PO) Take 20 mg by mouth daily. 1/2 tab daily        DIAZEPAM (VALIUM PO) Take  by mouth.  valacyclovir (VALTREX) 500 mg tablet Take  by mouth two (2) times a day.  MULTIVITAMIN PO Take  by mouth.  famotidine (PEPCID) 20 mg tablet Take 1 Tab by mouth two (2) times a day. 60 Tab 0       Objective:     Visit Vitals    BP (!) 130/92 (BP 1 Location: Left arm, BP Patient Position: Sitting)    Pulse 88    Resp 18    SpO2 100%        Physical Exam:   General: He is alert, oriented and in no respiratory distress at rest.  His affect is normal.  HEENT: Extraocular muscles are intact. Sclera are anicteric. Neck: No JVD  CVS: Lower extremities approximately equal in size. RS: No accessory muscle use  Neuro: Normal gait  Extrm: No edema, clubbing or cyanosis.   Skin: no facial rash    Data review: Imaging:  I have personally reviewed the patients radiographs and have reviewed the reports: The CT scan of the chest from May 4, 2018 shows no lymphadenopathy of note. There are no pleural effusions. There is good opacification of the pulmonary arteries to the right upper lobe and to the left lobes. The right lower lobe shows ongoing clot. The vessels are visible further into the periphery than on previous exams, but the opacification remains poor. Patient Active Problem List   Diagnosis Code    Calculus of kidney N20.0    Dyspnea R06.00    Bilateral pulmonary embolism (HCC) I26.99    Leg DVT (deep venous thromboembolism), acute, left (HCC) I82.402    Pneumonia J18.9    Pulmonary embolism (HCC) I26.99    Troponin I above reference range R74.8       IMPRESSION:   · DVT/PE with ongoing clot despite almost 4 months of therapy. RECOMMENDATIONS:   · Repeat chest CT in 3 months with contrast.    The patient's echocardiogram returned to normal on his last office visit. There will be no reason to repeat that study. On his next office visit, should there be evidence of ongoing clot, he will be referred to [facility] for evaluation of thrombectomy. I suspect that he will not be deemed a thrombectomy candidate at that time, but early evaluation is appropriate. He will require lifelong anticoagulation. Mr. Zuleta has a reminder for a \"due or due soon\" health maintenance. I have asked that he contact his primary care provider for follow-up on this health maintenance.          Miguel Cervantes MD

## 2018-12-26 ENCOUNTER — HOSPITAL ENCOUNTER (OUTPATIENT)
Dept: CT IMAGING | Age: 73
Discharge: HOME | End: 2018-12-26
Attending: INTERNAL MEDICINE
Payer: MEDICARE

## 2018-12-26 ENCOUNTER — HOSPITAL ENCOUNTER (OUTPATIENT)
Dept: VASCULAR SURGERY | Age: 73
Discharge: HOME | End: 2018-12-26
Attending: INTERNAL MEDICINE
Payer: MEDICARE

## 2018-12-26 DIAGNOSIS — I82.409 DEEP VEIN THROMBOSIS (HCC): ICD-10-CM

## 2018-12-26 DIAGNOSIS — I26.99 PULMONARY EMBOLISM AND INFARCTION (HCC): Primary | ICD-10-CM

## 2018-12-26 DIAGNOSIS — I26.99 PULMONARY EMBOLISM AND INFARCTION (HCC): ICD-10-CM

## 2018-12-26 LAB — CREAT UR-MCNC: 1.4 MG/DL (ref 0.6–1.3)

## 2018-12-26 PROCEDURE — 93970 EXTREMITY STUDY: CPT

## 2018-12-26 PROCEDURE — 71275 CT ANGIOGRAPHY CHEST: CPT

## 2018-12-26 PROCEDURE — 74011636320 HC RX REV CODE- 636/320: Performed by: INTERNAL MEDICINE

## 2018-12-26 PROCEDURE — 82565 ASSAY OF CREATININE: CPT

## 2018-12-26 RX ORDER — SODIUM CHLORIDE 0.9 % (FLUSH) 0.9 %
5-10 SYRINGE (ML) INJECTION
Status: DISCONTINUED | OUTPATIENT
Start: 2018-12-26 | End: 2018-12-30 | Stop reason: HOSPADM

## 2018-12-26 RX ADMIN — IOPAMIDOL 80 ML: 755 INJECTION, SOLUTION INTRAVENOUS at 09:18

## 2019-01-07 ENCOUNTER — OFFICE VISIT (OUTPATIENT)
Dept: PULMONOLOGY | Facility: CLINIC | Age: 74
End: 2019-01-07

## 2019-01-07 VITALS
HEART RATE: 97 BPM | DIASTOLIC BLOOD PRESSURE: 112 MMHG | OXYGEN SATURATION: 95 % | WEIGHT: 188 LBS | BODY MASS INDEX: 26.32 KG/M2 | SYSTOLIC BLOOD PRESSURE: 162 MMHG | HEIGHT: 71 IN

## 2019-01-07 DIAGNOSIS — I27.82: Primary | ICD-10-CM

## 2019-01-07 NOTE — PROGRESS NOTES
[facility]  Pulmonary, Critical Care, and Sleep Medicine       Pulmonary Office Progress Notes       Subjective   Subjective: The patient presents for follow-up on pulmonary emboli. Since he was last here the patient denies any problems with chest pain, shortness of breath or lower extremity edema. He had a CT scan performed with PE protocol when he was in [facility] as part of scheduled follow-up for his known VTE disease. There was no appreciable change in the pulmonary clots, and his physician there recommended following the process for 1 year before referring him for consideration of thrombectomy. Interestingly this was the current plan here as well. Objective:   He is alert, oriented and in no rest kendal distress at rest.  Affect is normal.  Blood pressure (!) 162/112, pulse 97, height 5' 10.5\" (1.791 m), weight 85.3 kg (188 lb), SpO2 95 %. He is in no respiratory distress and rest.  Chest wall excursion is normal.  There is no conversational dyspnea. Lungs show no wheezes rales or rhonchi. Auscultation of the right chest shows no S4. There is a systolic ejection murmur. Gait is normal.    I personally reviewed and interpreted the patient's chest CT PE protocol from 12/26/18. In short there is no significant change in the pulmonary emboli. I agree with the official report that there is a suggestion that the blood flow to the right lower lobe is now completely occluded. There are no new changes. There is no evidence of lung mass. There are no concerning lymph nodes.                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 The most appropriate follow-up will be done in [place] with whichever physicians he sees there, as well as any that will be performing the thrombectomy here. He will of course be welcome back at this office at any time.

## 2019-01-07 NOTE — PROGRESS NOTES
Chief Complaint   Patient presents with    Results     patient is here today to review his results of his CT scan. 1. Have you been to the ER, urgent care clinic since your last visit? Hospitalized since your last visit? No    2. Have you seen or consulted any other health care providers outside of the [address] [place] [address] since your last visit? Include any pap smears or colon screening.  No

## 2019-05-08 ENCOUNTER — HOSPITAL ENCOUNTER (OUTPATIENT)
Dept: RESPIRATORY THERAPY | Age: 74
Discharge: HOME | End: 2019-05-08
Attending: INTERNAL MEDICINE
Payer: MEDICARE

## 2019-05-08 DIAGNOSIS — R06.02 SOB (SHORTNESS OF BREATH): ICD-10-CM

## 2019-05-08 PROCEDURE — 94726 PLETHYSMOGRAPHY LUNG VOLUMES: CPT

## 2019-05-08 PROCEDURE — 94729 DIFFUSING CAPACITY: CPT

## 2019-05-08 PROCEDURE — 94060 EVALUATION OF WHEEZING: CPT

## 2021-01-12 ENCOUNTER — HOSPITAL ENCOUNTER (OUTPATIENT)
Dept: LAB | Age: 76
Discharge: HOME | End: 2021-01-12
Payer: MEDICARE

## 2021-01-12 LAB
ALBUMIN SERPL-MCNC: 3.7 G/DL (ref 3.4–5)
ALBUMIN/GLOB SERPL: 1.5 {RATIO} (ref 0.8–1.7)
ALP SERPL-CCNC: 42 U/L (ref 45–117)
ALT SERPL-CCNC: 54 U/L (ref 16–61)
ANION GAP SERPL CALC-SCNC: 5 MMOL/L (ref 3–18)
AST SERPL-CCNC: 25 U/L (ref 10–38)
BASOPHILS # BLD: 0 K/UL (ref 0–0.1)
BASOPHILS NFR BLD: 0 % (ref 0–2)
BILIRUB SERPL-MCNC: 1.6 MG/DL (ref 0.2–1)
BUN SERPL-MCNC: 14 MG/DL (ref 7–18)
BUN/CREAT SERPL: 10 (ref 12–20)
CALCIUM SERPL-MCNC: 8.8 MG/DL (ref 8.5–10.1)
CHLORIDE SERPL-SCNC: 105 MMOL/L (ref 100–111)
CHOLEST SERPL-MCNC: 148 MG/DL
CO2 SERPL-SCNC: 33 MMOL/L (ref 21–32)
CREAT SERPL-MCNC: 1.36 MG/DL (ref 0.6–1.3)
DIFFERENTIAL METHOD BLD: ABNORMAL
EOSINOPHIL # BLD: 0.2 K/UL (ref 0–0.4)
EOSINOPHIL NFR BLD: 5 % (ref 0–5)
ERYTHROCYTE [DISTWIDTH] IN BLOOD BY AUTOMATED COUNT: 15.5 % (ref 11.6–14.5)
GLOBULIN SER CALC-MCNC: 2.5 G/DL (ref 2–4)
GLUCOSE SERPL-MCNC: 82 MG/DL (ref 74–99)
HCT VFR BLD AUTO: 39.6 % (ref 36–48)
HDLC SERPL-MCNC: 57 MG/DL (ref 40–60)
HDLC SERPL: 2.6 {RATIO} (ref 0–5)
HGB BLD-MCNC: 13.3 G/DL (ref 13–16)
LDLC SERPL CALC-MCNC: 41.4 MG/DL (ref 0–100)
LIPID PROFILE,FLP: ABNORMAL
LYMPHOCYTES # BLD: 1.8 K/UL (ref 0.9–3.6)
LYMPHOCYTES NFR BLD: 37 % (ref 21–52)
MCH RBC QN AUTO: 36.3 PG (ref 24–34)
MCHC RBC AUTO-ENTMCNC: 33.6 G/DL (ref 31–37)
MCV RBC AUTO: 108.2 FL (ref 74–97)
MONOCYTES # BLD: 0.7 K/UL (ref 0.05–1.2)
MONOCYTES NFR BLD: 15 % (ref 3–10)
NEUTS SEG # BLD: 2.1 K/UL (ref 1.8–8)
NEUTS SEG NFR BLD: 43 % (ref 40–73)
PLATELET # BLD AUTO: 89 K/UL (ref 135–420)
PLATELET COMMENTS,PCOM: ABNORMAL
PMV BLD AUTO: 11 FL (ref 9.2–11.8)
POTASSIUM SERPL-SCNC: 3.8 MMOL/L (ref 3.5–5.5)
PROT SERPL-MCNC: 6.2 G/DL (ref 6.4–8.2)
RBC # BLD AUTO: 3.66 M/UL (ref 4.7–5.5)
RBC MORPH BLD: ABNORMAL
RBC MORPH BLD: ABNORMAL
SODIUM SERPL-SCNC: 143 MMOL/L (ref 136–145)
TRIGL SERPL-MCNC: 248 MG/DL (ref ?–150)
TSH SERPL DL<=0.05 MIU/L-ACNC: 0.01 UIU/ML (ref 0.36–3.74)
VIT B12 SERPL-MCNC: 581 PG/ML (ref 211–911)
VLDLC SERPL CALC-MCNC: 49.6 MG/DL
WBC # BLD AUTO: 4.8 K/UL (ref 4.6–13.2)

## 2021-01-12 PROCEDURE — 84443 ASSAY THYROID STIM HORMONE: CPT

## 2021-01-12 PROCEDURE — 80053 COMPREHEN METABOLIC PANEL: CPT

## 2021-01-12 PROCEDURE — 82607 VITAMIN B-12: CPT

## 2021-01-12 PROCEDURE — 80061 LIPID PANEL: CPT

## 2021-01-12 PROCEDURE — 36415 COLL VENOUS BLD VENIPUNCTURE: CPT

## 2021-01-12 PROCEDURE — 85025 COMPLETE CBC W/AUTO DIFF WBC: CPT

## 2021-04-23 ENCOUNTER — TRANSCRIBE ORDER (OUTPATIENT)
Dept: SCHEDULING | Age: 76
End: 2021-04-23

## 2021-04-23 DIAGNOSIS — M54.2 CERVICALGIA: Primary | ICD-10-CM

## 2021-05-12 ENCOUNTER — HOSPITAL ENCOUNTER (OUTPATIENT)
Age: 76
Discharge: HOME | End: 2021-05-12
Attending: INTERNAL MEDICINE
Payer: MEDICARE

## 2021-05-12 DIAGNOSIS — M54.2 CERVICALGIA: ICD-10-CM

## 2021-05-12 PROCEDURE — 72141 MRI NECK SPINE W/O DYE: CPT

## 2021-07-28 DIAGNOSIS — R01.1 CARDIAC MURMUR: Primary | ICD-10-CM

## 2022-03-19 PROBLEM — I26.99 BILATERAL PULMONARY EMBOLISM (HCC): Status: ACTIVE | Noted: 2018-01-17

## 2022-03-19 PROBLEM — J18.9 PNEUMONIA: Status: ACTIVE | Noted: 2018-01-17

## 2022-03-19 PROBLEM — R79.89 TROPONIN I ABOVE REFERENCE RANGE: Status: ACTIVE | Noted: 2018-01-17

## 2022-03-19 PROBLEM — I82.402 LEG DVT (DEEP VENOUS THROMBOEMBOLISM), ACUTE, LEFT (HCC): Status: ACTIVE | Noted: 2018-01-17

## 2022-03-19 PROBLEM — R06.00 DYSPNEA: Status: ACTIVE | Noted: 2018-01-17

## 2022-03-20 PROBLEM — I26.99 PULMONARY EMBOLISM (HCC): Status: ACTIVE | Noted: 2018-01-17

## 2022-03-29 ENCOUNTER — TRANSCRIBE ORDER (OUTPATIENT)
Dept: SCHEDULING | Age: 77
End: 2022-03-29

## 2022-03-29 DIAGNOSIS — E23.0 HYPOPITUITARISM (HCC): Primary | ICD-10-CM

## 2022-04-18 ENCOUNTER — HOSPITAL ENCOUNTER (OUTPATIENT)
Age: 77
Discharge: HOME | End: 2022-04-18
Attending: INTERNAL MEDICINE
Payer: MEDICARE

## 2022-04-18 DIAGNOSIS — E23.0 HYPOPITUITARISM (HCC): ICD-10-CM

## 2022-04-18 PROCEDURE — 74011250636 HC RX REV CODE- 250/636

## 2022-04-18 PROCEDURE — 82565 ASSAY OF CREATININE: CPT

## 2022-04-18 PROCEDURE — 70553 MRI BRAIN STEM W/O & W/DYE: CPT

## 2022-04-18 PROCEDURE — A9577 INJ MULTIHANCE: HCPCS

## 2022-04-18 RX ADMIN — GADOBENATE DIMEGLUMINE 19 ML: 529 INJECTION, SOLUTION INTRAVENOUS at 12:04

## 2022-04-19 LAB — CREAT UR-MCNC: 1.4 MG/DL (ref 0.6–1.3)

## 2022-06-02 ENCOUNTER — TRANSCRIBE ORDER (OUTPATIENT)
Dept: SCHEDULING | Age: 77
End: 2022-06-02

## 2022-06-02 DIAGNOSIS — R19.09 GROIN SWELLING: Primary | ICD-10-CM

## 2022-06-15 ENCOUNTER — TRANSCRIBE ORDER (OUTPATIENT)
Dept: SCHEDULING | Age: 77
End: 2022-06-15

## 2022-06-15 DIAGNOSIS — R05.3 CHRONIC COUGH: Primary | ICD-10-CM

## 2022-06-17 ENCOUNTER — TRANSCRIBE ORDER (OUTPATIENT)
Dept: SCHEDULING | Age: 77
End: 2022-06-17

## 2022-06-17 ENCOUNTER — HOSPITAL ENCOUNTER (OUTPATIENT)
Dept: CT IMAGING | Age: 77
Discharge: HOME | End: 2022-06-17
Attending: INTERNAL MEDICINE
Payer: MEDICARE

## 2022-06-17 DIAGNOSIS — R60.0 LOCALIZED EDEMA: Primary | ICD-10-CM

## 2022-06-17 DIAGNOSIS — R19.09 GROIN SWELLING: ICD-10-CM

## 2022-06-17 LAB — CREAT UR-MCNC: 1.3 MG/DL (ref 0.6–1.3)

## 2022-06-17 PROCEDURE — 74011000636 HC RX REV CODE- 636

## 2022-06-17 PROCEDURE — 72193 CT PELVIS W/DYE: CPT

## 2022-06-17 PROCEDURE — 82565 ASSAY OF CREATININE: CPT

## 2022-06-17 RX ADMIN — IOPAMIDOL 80 ML: 612 INJECTION, SOLUTION INTRAVENOUS at 12:44

## 2022-06-23 ENCOUNTER — HOSPITAL ENCOUNTER (OUTPATIENT)
Dept: CT IMAGING | Age: 77
Discharge: HOME | End: 2022-06-23
Attending: INTERNAL MEDICINE
Payer: MEDICARE

## 2022-06-23 DIAGNOSIS — R05.3 CHRONIC COUGH: ICD-10-CM

## 2022-06-23 PROCEDURE — 74011000636 HC RX REV CODE- 636

## 2022-06-23 PROCEDURE — 71260 CT THORAX DX C+: CPT

## 2022-06-23 RX ADMIN — IOPAMIDOL 80 ML: 612 INJECTION, SOLUTION INTRAVENOUS at 15:41

## 2022-06-24 ENCOUNTER — HOSPITAL ENCOUNTER (OUTPATIENT)
Dept: VASCULAR SURGERY | Age: 77
Discharge: HOME | End: 2022-06-24
Attending: INTERNAL MEDICINE
Payer: MEDICARE

## 2022-06-24 DIAGNOSIS — R60.0 LOCALIZED EDEMA: ICD-10-CM

## 2022-06-24 PROCEDURE — 93971 EXTREMITY STUDY: CPT

## 2024-04-23 ENCOUNTER — HOSPITAL ENCOUNTER (OUTPATIENT)
Facility: HOSPITAL | Age: 79
Setting detail: RECURRING SERIES
Discharge: HOME | End: 2024-04-26
Payer: MEDICARE

## 2024-04-23 PROCEDURE — 97162 PT EVAL MOD COMPLEX 30 MIN: CPT

## 2024-04-23 PROCEDURE — 97112 NEUROMUSCULAR REEDUCATION: CPT

## 2024-04-23 PROCEDURE — 97110 THERAPEUTIC EXERCISES: CPT

## 2024-04-23 NOTE — PROGRESS NOTES
PHYSICAL / OCCUPATIONAL THERAPY - DAILY TREATMENT NOTE     Patient Name: Jorge Corado Jr.    Date: 2024    : 1945  Insurance: Payor: MEDICARE / Plan: MEDICARE PART A AND B / Product Type: *No Product type* /      Patient  verified Yes     Visit #   Current / Total 1 24   Time   In / Out 1:03 1:50   Pain   In / Out 0/10 0/10   Subjective Functional Status/Changes: The patient has a chief complaint of decreased balance, and weakness that has been a gradual onset over the last several years.    Changes to:  Allergies, Med Hx, Sx Hx?   no       TREATMENT AREA =  Unsteadiness on feet [R26.81]    OBJECTIVE  Therapeutic Procedures:  Tx Min Billable or 1:1 Min (if diff from Tx Min) Procedure, Rationale, Specifics   15  33107 Therapeutic Exercise (timed):  increase ROM, strength, coordination, balance, and proprioception to improve patient's ability to progress to PLOF and address remaining functional goals. (see flow sheet as applicable)    Details if applicable:       15  83305 Neuromuscular Re-Education (timed):  improve balance, coordination, kinesthetic sense, posture, core stability and proprioception to improve patient's ability to develop conscious control of individual muscles and awareness of position of extremities in order to progress to PLOF and address remaining functional goals. (see flow sheet as applicable)    Details if applicable:     30  MC BC Totals Reminder: bill using total billable min of TIMED therapeutic procedures (example: do not include dry needle or estim unattended, both untimed codes, in totals to left)  8-22 min = 1 unit; 23-37 min = 2 units; 38-52 min = 3 units; 53-67 min = 4 units; 68-82 min = 5 units   Total Total     TOTAL TREATMENT TIME:        47     [x]  Patient Education billed concurrently with other procedures   [x] Review HEP    [] Progressed/Changed HEP, detail:    [] Other detail:       Objective Information/Functional Measures/Assessment    See POC    Patient

## 2024-04-23 NOTE — PROGRESS NOTES
[facility]  [address] [place] [address] Ph:[phone] Fx: [phone]    PLAN OF CARE/ Statement of Necessity for Physical Therapy Services           Patient name: Jorge Corado Jr. Start of Care: 2024   Referral source: Linda Gambino MD : 1945    Medical Diagnosis: Unsteadiness on feet [R26.81]       Onset Date: 2019   Treatment Diagnosis: R26.81  Unsteadiness on feet                                     Prior Hospitalization: see medical history Provider#: [id number]   Medications: Verified on Patient Summary List     Comorbidities: HTN, hx PE, Multiple Myeloma, hernia repair  Prior Level of Function: The patient reports improved balance, and improved posture as well as less fatigue prior to about a year ago.    The Plan of Care and following information is based on the information from the initial evaluation.    Assessment / key information:  The patient is a 78 year old male with a chief complaint of unsteadiness on his feet and decreased balance as well as some fatigue. He has a diagnosis of Multiple Myeloma and is being treated with an oral regimen 3 weeks on, 1 week off. The patient reports that he has not done exercise in about 4-5 years. He does have a history of pulmonary embolism, and is on blood thinners. The patient presents with impairments consisting of decreased strength of LEs, decreased flexibility, decreased ease of ADLs, and decreased chore efficiency. The patient will benefit from skilled PT in order to address the aforementioned impairments.    Evaluation Complexity:  History:  HIGH Complexity :3+ comorbidities / personal factors will impact the outcome/ POC ; Examination:  MEDIUM Complexity : 3 Standardized tests and measures addressin body structure, function, activity limitation and / or participation in recreation  ;Presentation:  MEDIUM Complexity : Evolving with changing characteristics  ;Clinical Decision  Lunch order placed        Marycruz Starr RN  01/22/18 2307

## 2024-05-08 ENCOUNTER — HOSPITAL ENCOUNTER (OUTPATIENT)
Facility: HOSPITAL | Age: 79
Setting detail: RECURRING SERIES
Discharge: HOME | End: 2024-05-11
Payer: MEDICARE

## 2024-05-08 PROCEDURE — 97110 THERAPEUTIC EXERCISES: CPT

## 2024-05-08 PROCEDURE — 97112 NEUROMUSCULAR REEDUCATION: CPT

## 2024-05-08 PROCEDURE — 97530 THERAPEUTIC ACTIVITIES: CPT

## 2024-05-08 NOTE — PROGRESS NOTES
PHYSICAL / OCCUPATIONAL THERAPY - DAILY TREATMENT NOTE     Patient Name: Jorge Corado Jr.    Date: 2024    : 1945  Insurance: Payor: MEDICARE / Plan: MEDICARE PART A AND B / Product Type: *No Product type* /      Patient  verified Yes     Visit #   Current / Total 2 24   Time   In / Out 3:08 3:52   Pain   In / Out 0/10 0/10   Subjective Functional Status/Changes: The patient reports that his balance has improved a little since beginning home exercises.   Changes to:  Allergies, Med Hx, Sx Hx?   yes ; has begun B12 shots.     TREATMENT AREA =  Unsteadiness on feet [R26.81]    OBJECTIVE  Therapeutic Procedures:  Tx Min Billable or 1:1 Min (if diff from Tx Min) Procedure, Rationale, Specifics   22 20 [id number] Therapeutic Exercise (timed):  increase ROM, strength, coordination, balance, and proprioception to improve patient's ability to progress to PLOF and address remaining functional goals. (see flow sheet as applicable)    Details if applicable:       12 10 44907 Neuromuscular Re-Education (timed):  improve balance, coordination, kinesthetic sense, posture, core stability and proprioception to improve patient's ability to develop conscious control of individual muscles and awareness of position of extremities in order to progress to PLOF and address remaining functional goals. (see flow sheet as applicable)    Details if applicable:     10 10 63069 Therapeutic Activity (timed):  use of dynamic activities replicating functional movements to increase ROM, strength, coordination, balance, and proprioception in order to improve patient's ability to progress to PLOF and address remaining functional goals.  (see flow sheet as applicable)     Details if applicable:     44 40 [facility] Totals Reminder: bill using total billable min of TIMED therapeutic procedures (example: do not include dry needle or estim unattended, both untimed codes, in totals to left)  8-22 min = 1 unit; 23-37 min = 2 units; 38-52 min = 3

## 2024-05-10 ENCOUNTER — HOSPITAL ENCOUNTER (OUTPATIENT)
Facility: HOSPITAL | Age: 79
Setting detail: RECURRING SERIES
Discharge: HOME | End: 2024-05-13
Payer: MEDICARE

## 2024-05-10 PROCEDURE — 97110 THERAPEUTIC EXERCISES: CPT

## 2024-05-10 PROCEDURE — 97112 NEUROMUSCULAR REEDUCATION: CPT

## 2024-05-10 NOTE — PROGRESS NOTES
PHYSICAL / OCCUPATIONAL THERAPY - DAILY TREATMENT NOTE     Patient Name: Jorge Corado Jr.    Date: 5/10/2024    : 1945  Insurance: Payor: MEDICARE / Plan: MEDICARE PART A AND B / Product Type: *No Product type* /      Patient  verified Yes     Visit #   Current / Total 3 24   Time   In / Out 2:00 2:30   Pain   In / Out 0/10 0/10   Subjective Functional Status/Changes: The patient reports he is doing well, denies pain upon arrival.   Changes to:  Allergies, Med Hx, Sx Hx?   no       TREATMENT AREA =  Unsteadiness on feet [R26.81]    OBJECTIVE  Therapeutic Procedures:  Tx Min Billable or 1:1 Min (if diff from Tx Min) Procedure, Rationale, Specifics   20  [id number] Therapeutic Exercise (timed):  increase ROM, strength, coordination, balance, and proprioception to improve patient's ability to progress to PLOF and address remaining functional goals. (see flow sheet as applicable)    Details if applicable:       10  04537 Neuromuscular Re-Education (timed):  improve balance, coordination, kinesthetic sense, posture, core stability and proprioception to improve patient's ability to develop conscious control of individual muscles and awareness of position of extremities in order to progress to PLOF and address remaining functional goals. (see flow sheet as applicable)    Details if applicable:     30  [facility] Totals Reminder: bill using total billable min of TIMED therapeutic procedures (example: do not include dry needle or estim unattended, both untimed codes, in totals to left)  8-22 min = 1 unit; 23-37 min = 2 units; 38-52 min = 3 units; 53-67 min = 4 units; 68-82 min = 5 units   Total Total     TOTAL TREATMENT TIME:        30     [x]  Patient Education billed concurrently with other procedures   [x] Review HEP    [] Progressed/Changed HEP, detail:    [] Other detail:       Objective Information/Functional Measures/Assessment    HS flexibility: 32 right; 40 left    The patient is progressing with hamstring

## 2024-05-13 ENCOUNTER — HOSPITAL ENCOUNTER (OUTPATIENT)
Facility: HOSPITAL | Age: 79
Setting detail: RECURRING SERIES
Discharge: HOME | End: 2024-05-16
Payer: MEDICARE

## 2024-05-13 PROCEDURE — 97110 THERAPEUTIC EXERCISES: CPT

## 2024-05-13 PROCEDURE — 97112 NEUROMUSCULAR REEDUCATION: CPT

## 2024-05-13 NOTE — PROGRESS NOTES
PHYSICAL / OCCUPATIONAL THERAPY - DAILY TREATMENT NOTE     Patient Name: Jorge Corado Jr.    Date: 2024    : 1945  Insurance: Payor: MEDICARE / Plan: MEDICARE PART A AND B / Product Type: *No Product type* /      Patient  verified Yes     Visit #   Current / Total 4 24   Time   In / Out 2:27 3:10   Pain   In / Out 0/10 0/10   Subjective Functional Status/Changes: Pt denies new complaints. He states that he cut his toe when trimming his toe nails and it bled, but it is under control, he is not having    Changes to:  Allergies, Med Hx, Sx Hx?   no       TREATMENT AREA =  Unsteadiness on feet [R26.81]    OBJECTIVE  Therapeutic Procedures:  Tx Min Billable or 1:1 Min (if diff from Tx Min) Procedure, Rationale, Specifics   31  01822 Therapeutic Exercise (timed):  increase ROM, strength, coordination, balance, and proprioception to improve patient's ability to progress to PLOF and address remaining functional goals. (see flow sheet as applicable)    Details if applicable:       12  17656 Neuromuscular Re-Education (timed):  improve balance, coordination, kinesthetic sense, posture, core stability and proprioception to improve patient's ability to develop conscious control of individual muscles and awareness of position of extremities in order to progress to PLOF and address remaining functional goals. (see flow sheet as applicable)    Details if applicable:     43  [facility] Totals Reminder: bill using total billable min of TIMED therapeutic procedures (example: do not include dry needle or estim unattended, both untimed codes, in totals to left)  8-22 min = 1 unit; 23-37 min = 2 units; 38-52 min = 3 units; 53-67 min = 4 units; 68-82 min = 5 units   Total Total     TOTAL TREATMENT TIME:        43     [x]  Patient Education billed concurrently with other procedures   [x] Review HEP    [] Progressed/Changed HEP, detail:    [] Other detail:       Objective Information/Functional Measures/Assessment    Tandem

## 2024-05-15 ENCOUNTER — HOSPITAL ENCOUNTER (OUTPATIENT)
Facility: HOSPITAL | Age: 79
Setting detail: RECURRING SERIES
Discharge: HOME | End: 2024-05-18
Payer: MEDICARE

## 2024-05-15 PROCEDURE — 97110 THERAPEUTIC EXERCISES: CPT

## 2024-05-15 PROCEDURE — 97112 NEUROMUSCULAR REEDUCATION: CPT

## 2024-05-15 PROCEDURE — 97530 THERAPEUTIC ACTIVITIES: CPT

## 2024-05-15 NOTE — PROGRESS NOTES
PHYSICAL / OCCUPATIONAL THERAPY - DAILY TREATMENT NOTE     Patient Name: Jorge Corado Jr.    Date: 5/15/2024    : 1945  Insurance: Payor: MEDICARE / Plan: MEDICARE PART A AND B / Product Type: *No Product type* /      Patient  verified Yes     Visit #   Current / Total 5 24   Time   In / Out 2:30 3:12   Pain   In / Out 0/10 0/10   Subjective Functional Status/Changes: The patient reports that he is doing well just a little \"fuzzy\" due to being on the end of his 21 days treatment regimen of the multiple myeloma medication.   Changes to:  Allergies, Med Hx, Sx Hx?   no       TREATMENT AREA =  Unsteadiness on feet [R26.81]    OBJECTIVE  Therapeutic Procedures:  Tx Min Billable or 1:1 Min (if diff from Tx Min) Procedure, Rationale, Specifics   20  42364 Therapeutic Exercise (timed):  increase ROM, strength, coordination, balance, and proprioception to improve patient's ability to progress to PLOF and address remaining functional goals. (see flow sheet as applicable)    Details if applicable:       12  05811 Neuromuscular Re-Education (timed):  improve balance, coordination, kinesthetic sense, posture, core stability and proprioception to improve patient's ability to develop conscious control of individual muscles and awareness of position of extremities in order to progress to PLOF and address remaining functional goals. (see flow sheet as applicable)    Details if applicable:     10  08641 Therapeutic Activity (timed):  use of dynamic activities replicating functional movements to increase ROM, strength, coordination, balance, and proprioception in order to improve patient's ability to progress to PLOF and address remaining functional goals.  (see flow sheet as applicable)     Details if applicable:     42  [facility] Totals Reminder: bill using total billable min of TIMED therapeutic procedures (example: do not include dry needle or estim unattended, both untimed codes, in totals to left)  8-22 min = 1 unit;

## 2024-05-21 ENCOUNTER — HOSPITAL ENCOUNTER (OUTPATIENT)
Facility: HOSPITAL | Age: 79
Setting detail: RECURRING SERIES
Discharge: HOME | End: 2024-05-24
Payer: MEDICARE

## 2024-05-21 PROCEDURE — 97110 THERAPEUTIC EXERCISES: CPT

## 2024-05-21 PROCEDURE — 97112 NEUROMUSCULAR REEDUCATION: CPT

## 2024-05-21 NOTE — PROGRESS NOTES
PHYSICAL / OCCUPATIONAL THERAPY - DAILY TREATMENT NOTE     Patient Name: Jorge Corado Jr.    Date: 2024    : 1945  Insurance: Payor: MEDICARE / Plan: MEDICARE PART A AND B / Product Type: *No Product type* /      Patient  verified Yes     Visit #   Current / Total 6 24   Time   In / Out 11:50 12:32   Pain   In / Out 0/10 0/10   Subjective Functional Status/Changes: The patient reports that he is doing well. He states the Neurologist informed him to perform 60 minutes of cycling to address peripheral neuropathy in feet.   Changes to:  Allergies, Med Hx, Sx Hx?   no       TREATMENT AREA =  Unsteadiness on feet [R26.81]    OBJECTIVE  Therapeutic Procedures:  Tx Min Billable or 1:1 Min (if diff from Tx Min) Procedure, Rationale, Specifics   32 30 36568 Therapeutic Exercise (timed):  increase ROM, strength, coordination, balance, and proprioception to improve patient's ability to progress to PLOF and address remaining functional goals. (see flow sheet as applicable)    Details if applicable:       10 10 83834 Neuromuscular Re-Education (timed):  improve balance, coordination, kinesthetic sense, posture, core stability and proprioception to improve patient's ability to develop conscious control of individual muscles and awareness of position of extremities in order to progress to PLOF and address remaining functional goals. (see flow sheet as applicable)    Details if applicable:     42 40 [facility] Totals Reminder: bill using total billable min of TIMED therapeutic procedures (example: do not include dry needle or estim unattended, both untimed codes, in totals to left)  8-22 min = 1 unit; 23-37 min = 2 units; 38-52 min = 3 units; 53-67 min = 4 units; 68-82 min = 5 units   Total Total     TOTAL TREATMENT TIME:        42     [x]  Patient Education billed concurrently with other procedures   [x] Review HEP    [] Progressed/Changed HEP, detail:    [] Other detail:       Objective Information/Functional

## 2024-05-24 ENCOUNTER — HOSPITAL ENCOUNTER (OUTPATIENT)
Facility: HOSPITAL | Age: 79
Setting detail: RECURRING SERIES
Discharge: HOME | End: 2024-05-27
Payer: MEDICARE

## 2024-05-24 PROCEDURE — 97530 THERAPEUTIC ACTIVITIES: CPT

## 2024-05-24 PROCEDURE — 97110 THERAPEUTIC EXERCISES: CPT

## 2024-05-24 PROCEDURE — 97112 NEUROMUSCULAR REEDUCATION: CPT

## 2024-05-24 NOTE — PROGRESS NOTES
PHYSICAL / OCCUPATIONAL THERAPY - DAILY TREATMENT NOTE     Patient Name: Jorge Corado Jr.    Date: 2024    : 1945  Insurance: Payor: MEDICARE / Plan: MEDICARE PART A AND B / Product Type: *No Product type* /      Patient  verified Yes     Visit #   Current / Total 7 24   Time   In / Out 11:09 11:54   Pain   In / Out 0/10 0/10   Subjective Functional Status/Changes: No new complaints per patient report.    Changes to:  Allergies, Med Hx, Sx Hx?   no       TREATMENT AREA =  Unsteadiness on feet [R26.81]    OBJECTIVE  Therapeutic Procedures:  Tx Min Billable or 1:1 Min (if diff from Tx Min) Procedure, Rationale, Specifics   23  18144 Therapeutic Exercise (timed):  increase ROM, strength, coordination, balance, and proprioception to improve patient's ability to progress to PLOF and address remaining functional goals. (see flow sheet as applicable)    Details if applicable:       12  31946 Neuromuscular Re-Education (timed):  improve balance, coordination, kinesthetic sense, posture, core stability and proprioception to improve patient's ability to develop conscious control of individual muscles and awareness of position of extremities in order to progress to PLOF and address remaining functional goals. (see flow sheet as applicable)    Details if applicable:     10  55490 Therapeutic Activity (timed):  use of dynamic activities replicating functional movements to increase ROM, strength, coordination, balance, and proprioception in order to improve patient's ability to progress to PLOF and address remaining functional goals.  (see flow sheet as applicable)    Details if applicable:     45  [facility] Totals Reminder: bill using total billable min of TIMED therapeutic procedures (example: do not include dry needle or estim unattended, both untimed codes, in totals to left)  8-22 min = 1 unit; 23-37 min = 2 units; 38-52 min = 3 units; 53-67 min = 4 units; 68-82 min = 5 units   Total Total     TOTAL TREATMENT

## 2024-05-24 NOTE — PROGRESS NOTES
[facility]  [address] [place] [address] - Ph: [phone]   Fx: [phone]    PHYSICAL THERAPY PROGRESS NOTE      Patient name: Jorge Corado Jr. Start of Care: 2024   Referral source: Linda Gambino MD : 1945               Medical Diagnosis: Unsteadiness on feet [R26.81]        Onset Date: 2019   Treatment Diagnosis: R26.81  Unsteadiness on feet                                     Prior Hospitalization: see medical history Provider#: [id number]   Medications: Verified on Patient Summary List      Comorbidities: HTN, hx PE, Multiple Myeloma, hernia repair  Prior Level of Function: The patient reports improved balance, and improved posture as well as less fatigue prior to about a year ago.    Visits from Start of Care: 7    Missed Visits: 0    Goals/Measure of Progress: To be achieved in 4 weeks:    Short Term Goals: To be accomplished in 2 weeks  The patient will demonstrate independence and compliance with HEP to maximize therapeutic benefit.              IE: issued HEP              PN: Met - reports compliance   The patient will demonstrates HS flexibility to 35 degrees B to improve ease of ADLs.              IE: 60 degrees right, 57 degrees left              PN: Progressing HS flexibility: 32 right; 40 left   Long Term Goals: To be accomplished in 12 weeks  The patient will improve FOTO score to 55 to improve quality +of life.              IE: 47              PN: Progressed to 60  The patient will improve tandem stance airex to 30\" without LOB to reduce risk of falls.              IE: tandem stance 10\"              PN: Met 30\" on airex  The patient will demonstrate single leg stance for 10\" to reduce risk of falls.              IE: 3\"              PN: Progressing SLS: 9\" right; 6\" left  The patient will demonstrate negative Ely test to reduce ease of ambulation efficiency.              IE: 90 degrees              PN: 102

## 2024-05-28 ENCOUNTER — HOSPITAL ENCOUNTER (OUTPATIENT)
Facility: HOSPITAL | Age: 79
Setting detail: RECURRING SERIES
Discharge: HOME | End: 2024-05-31
Payer: MEDICARE

## 2024-05-28 PROCEDURE — 97110 THERAPEUTIC EXERCISES: CPT

## 2024-05-28 PROCEDURE — 97112 NEUROMUSCULAR REEDUCATION: CPT

## 2024-05-28 PROCEDURE — 97530 THERAPEUTIC ACTIVITIES: CPT

## 2024-05-28 NOTE — PROGRESS NOTES
PHYSICAL / OCCUPATIONAL THERAPY - DAILY TREATMENT NOTE     Patient Name: Jorge Corado Jr.    Date: 2024    : 1945  Insurance: Payor: MEDICARE / Plan: MEDICARE PART A AND B / Product Type: *No Product type* /      Patient  verified Yes     Visit #   Current / Total 7 24   Time   In / Out 11:50 12:45   Pain   In / Out 0/10 0/10   Subjective Functional Status/Changes: The patient states that he feels, \"just a bit fuzzy this morning.\"   Changes to:  Allergies, Med Hx, Sx Hx?   no       TREATMENT AREA =  Unsteadiness on feet [R26.81]    OBJECTIVE  Therapeutic Procedures:  Tx Min Billable or 1:1 Min (if diff from Tx Min) Procedure, Rationale, Specifics   33  23129 Therapeutic Exercise (timed):  increase ROM, strength, coordination, balance, and proprioception to improve patient's ability to progress to PLOF and address remaining functional goals. (see flow sheet as applicable)    Details if applicable:       12  10160 Neuromuscular Re-Education (timed):  improve balance, coordination, kinesthetic sense, posture, core stability and proprioception to improve patient's ability to develop conscious control of individual muscles and awareness of position of extremities in order to progress to PLOF and address remaining functional goals. (see flow sheet as applicable)    Details if applicable:     10  90941 Therapeutic Activity (timed):  use of dynamic activities replicating functional movements to increase ROM, strength, coordination, balance, and proprioception in order to improve patient's ability to progress to PLOF and address remaining functional goals.  (see flow sheet as applicable)     Details if applicable:       45231 Therapeutic Activity (timed):  use of dynamic activities replicating functional movements to increase ROM, strength, coordination, balance, and proprioception in order to improve patient's ability to progress to PLOF and address remaining functional goals.  (see flow sheet as